# Patient Record
Sex: FEMALE | Race: BLACK OR AFRICAN AMERICAN | NOT HISPANIC OR LATINO | Employment: OTHER | URBAN - METROPOLITAN AREA
[De-identification: names, ages, dates, MRNs, and addresses within clinical notes are randomized per-mention and may not be internally consistent; named-entity substitution may affect disease eponyms.]

---

## 2017-01-06 DIAGNOSIS — E11.9 TYPE 2 DIABETES MELLITUS WITHOUT COMPLICATION: ICD-10-CM

## 2017-01-20 ENCOUNTER — TELEPHONE (OUTPATIENT)
Dept: FAMILY MEDICINE | Facility: CLINIC | Age: 82
End: 2017-01-20

## 2017-01-20 NOTE — TELEPHONE ENCOUNTER
----- Message from Peggy Langford sent at 1/19/2017  4:46 PM CST -----  Contact: Nicolasa / granddaughter  Pt's granddaughter Nicolasa is calling to request and order for a lift chair due to pt having hip replacement.  Please call Nicolasa at .    Thanks

## 2017-01-24 ENCOUNTER — OFFICE VISIT (OUTPATIENT)
Dept: FAMILY MEDICINE | Facility: CLINIC | Age: 82
End: 2017-01-24
Payer: MEDICARE

## 2017-01-24 VITALS
HEART RATE: 94 BPM | DIASTOLIC BLOOD PRESSURE: 90 MMHG | SYSTOLIC BLOOD PRESSURE: 170 MMHG | HEIGHT: 64 IN | TEMPERATURE: 98 F | OXYGEN SATURATION: 98 %

## 2017-01-24 DIAGNOSIS — E78.5 HYPERLIPIDEMIA, UNSPECIFIED HYPERLIPIDEMIA TYPE: ICD-10-CM

## 2017-01-24 DIAGNOSIS — I10 ESSENTIAL HYPERTENSION: Primary | ICD-10-CM

## 2017-01-24 DIAGNOSIS — R29.898 WEAKNESS OF BOTH LOWER EXTREMITIES: ICD-10-CM

## 2017-01-24 DIAGNOSIS — M85.80 OSTEOPENIA: ICD-10-CM

## 2017-01-24 DIAGNOSIS — E11.39 TYPE 2 DIABETES MELLITUS WITH OTHER OPHTHALMIC COMPLICATION, WITHOUT LONG-TERM CURRENT USE OF INSULIN: ICD-10-CM

## 2017-01-24 DIAGNOSIS — Z87.81 HISTORY OF HIP FRACTURE: ICD-10-CM

## 2017-01-24 PROCEDURE — 99214 OFFICE O/P EST MOD 30 MIN: CPT | Mod: S$GLB,,, | Performed by: FAMILY MEDICINE

## 2017-01-24 PROCEDURE — 99999 PR PBB SHADOW E&M-EST. PATIENT-LVL III: CPT | Mod: PBBFAC,,, | Performed by: FAMILY MEDICINE

## 2017-01-24 PROCEDURE — 1159F MED LIST DOCD IN RCRD: CPT | Mod: S$GLB,,, | Performed by: FAMILY MEDICINE

## 2017-01-24 PROCEDURE — 1157F ADVNC CARE PLAN IN RCRD: CPT | Mod: S$GLB,,, | Performed by: FAMILY MEDICINE

## 2017-01-24 PROCEDURE — 1160F RVW MEDS BY RX/DR IN RCRD: CPT | Mod: S$GLB,,, | Performed by: FAMILY MEDICINE

## 2017-01-24 PROCEDURE — 1126F AMNT PAIN NOTED NONE PRSNT: CPT | Mod: S$GLB,,, | Performed by: FAMILY MEDICINE

## 2017-01-24 PROCEDURE — 99499 UNLISTED E&M SERVICE: CPT | Mod: S$GLB,,, | Performed by: FAMILY MEDICINE

## 2017-01-24 RX ORDER — BENAZEPRIL HYDROCHLORIDE 20 MG/1
30 TABLET ORAL DAILY
Qty: 45 TABLET | Refills: 11
Start: 2017-01-24 | End: 2017-02-14 | Stop reason: RX

## 2017-01-25 ENCOUNTER — PATIENT MESSAGE (OUTPATIENT)
Dept: FAMILY MEDICINE | Facility: CLINIC | Age: 82
End: 2017-01-25

## 2017-01-25 NOTE — TELEPHONE ENCOUNTER
Several attempts have been made to contact the patient about this message, sent note thru portal to contact the clinic at their convenience.

## 2017-01-26 ENCOUNTER — TELEPHONE (OUTPATIENT)
Dept: FAMILY MEDICINE | Facility: CLINIC | Age: 82
End: 2017-01-26

## 2017-01-26 NOTE — TELEPHONE ENCOUNTER
----- Message from Sandra Villa sent at 1/26/2017 10:36 AM CST -----  Contact: Daughter/Rosmery [phone]  Patient daughter returning a phone call. Please call Rosmery at [phone] Thank you

## 2017-01-26 NOTE — TELEPHONE ENCOUNTER
----- Message from Bryce De La Vega sent at 1/20/2017 11:38 AM CST -----  Contact: Nicolasa/Salas/[phone]  Nicolasa returned staff's call. Thank you.

## 2017-01-28 DIAGNOSIS — I10 ESSENTIAL HYPERTENSION: ICD-10-CM

## 2017-01-29 RX ORDER — BENAZEPRIL HYDROCHLORIDE 20 MG/1
TABLET ORAL
Qty: 90 TABLET | Refills: 0 | Status: SHIPPED | OUTPATIENT
Start: 2017-01-29 | End: 2017-02-01 | Stop reason: DRUGHIGH

## 2017-02-01 ENCOUNTER — PATIENT MESSAGE (OUTPATIENT)
Dept: FAMILY MEDICINE | Facility: CLINIC | Age: 82
End: 2017-02-01

## 2017-02-01 NOTE — TELEPHONE ENCOUNTER
Called pharmacy and they will give the patient enough 10 mg to go with the 20 mg. Also reprinted the lift order, this was faxed to [facility] but the way this works is that the patient buys the chair and the insurance will provide the lift mechanism. Numerous messages have been sent to the patient thru the portal.

## 2017-02-02 ENCOUNTER — TELEPHONE (OUTPATIENT)
Dept: FAMILY MEDICINE | Facility: CLINIC | Age: 82
End: 2017-02-02

## 2017-02-02 NOTE — PROGRESS NOTES
Office Visit    Patient Name: Yanira More    : 1925  MRN: [id number]    Subjective:  Yanira is a 89 y.o. female who presents today for:    Hospital Follow Up (pt just released from a skilled facility ) and Fall      This patient has multiple medical diagnoses as noted below.  This patient is known to me and to this clinic. She had a recent fall and was released from a SNF.  She did have a significant hip fracture from the fall.  She complete PT/OT at the SNF.  She does not live alone.  She does report that she has increased stiffness and pain in her left hip.  She does not ambulate as well prior to this fall.  As discussed in previous office visit, we reviewed the importance of removing clutter from the home and using walking assistance devices regularly.  She slipped and this cause the most recent injury.  She has a good appetite with minimal weight loss after this injury.        Active Problem List  Patient Active Problem List   Diagnosis    Diabetes mellitus, type 2    Hyperlipidemia    Arthritis    Degenerative disc disease    Glaucoma (increased eye pressure)    Essential tremor    PAD (peripheral artery disease)    Osteopenia    Neuropathy in diabetes    Essential hypertension    Hypophosphatasia    Tortuous aorta    History of hip fracture    Weakness of both lower extremities       Past Surgical History  Past Surgical History   Procedure Laterality Date    Hysterectomy         Family History  Family History   Problem Relation Age of Onset    Hypertension Sister     Diabetes Daughter     Early death Sister 50     reports natural causes       Social History  Social History     Social History    Marital status:      Spouse name: N/A    Number of children: N/A    Years of education: N/A     Occupational History    Not on file.     Social History Main Topics    Smoking status: Never Smoker    Smokeless tobacco: Never Used    Alcohol use No    Drug use: No    Sexual  "activity: No     Other Topics Concern    Not on file     Social History Narrative    Lives with her daughter Rosmery Catalan who has power of        Current Medications  Medications reviewed and updated.     Allergies   Review of patient's allergies indicates:  No Known Allergies    Review of Systems (Pertinent positives)  Review of Systems   Constitutional: Negative for activity change, appetite change, fatigue, fever and unexpected weight change.   HENT: Negative.  Negative for ear discharge, ear pain, rhinorrhea and sore throat.    Eyes: Negative.    Respiratory: Negative for apnea, cough, chest tightness, shortness of breath and wheezing.    Cardiovascular: Negative for chest pain, palpitations and leg swelling.   Gastrointestinal: Negative for abdominal distention, abdominal pain, constipation, diarrhea and vomiting.   Endocrine: Negative for cold intolerance, heat intolerance, polydipsia and polyuria.   Genitourinary: Negative for decreased urine volume, menstrual problem, urgency, vaginal bleeding, vaginal discharge and vaginal pain.   Musculoskeletal: Positive for arthralgias, gait problem and myalgias.   Skin: Negative for rash.   Neurological: Positive for weakness. Negative for dizziness and headaches.   Hematological: Does not bruise/bleed easily.   Psychiatric/Behavioral: Negative for agitation, sleep disturbance and suicidal ideas.       Visit Vitals    BP (!) 170/90 (BP Location: Right arm, Patient Position: Sitting, BP Method: Manual)    Pulse 94    Temp 98 °F (36.7 °C) (Oral)    Ht 5' 4" (1.626 m)    SpO2 98%       Physical Exam   Constitutional: She appears well-developed.   HENT:   Head: Normocephalic and atraumatic.   Right Ear: External ear normal.   Left Ear: External ear normal.   Nose: Nose normal.   Mouth/Throat: Oropharynx is clear and moist.   Eyes: Conjunctivae and EOM are normal. Pupils are equal, round, and reactive to light.   Cardiovascular:   Murmur heard.   Systolic " murmur is present with a grade of 3/6   Pulmonary/Chest: Effort normal and breath sounds normal.   Neurological: She is alert.   Skin: Skin is warm and dry.   Psychiatric: She has a normal mood and affect. Her behavior is normal.   Vitals reviewed.      Health Maintenance  Health Maintenance Topics with due status: Not Due       Topic Last Completion Date    TETANUS VACCINE 03/19/2015    Foot Exam 01/24/2017       Assessment/Plan:  Yanira More is a 89 y.o. female who presents today for :    Yanira was seen today for hospital follow up and fall.    Diagnoses and all orders for this visit:    Essential hypertension  -     benazepril (LOTENSIN) 20 MG tablet; Take 1.5 tablets (30 mg total) by mouth once daily.  -  Take as instructed    Type 2 diabetes mellitus with other ophthalmic complication, without long-term current use of insulin  -  Pt is currently stable on medication regimen.  Continue current therapy as scheduled.  Contact office with any questions about adjustments on medications.   -  Monitor for hypoglycemic episodes   Hyperlipidemia, unspecified hyperlipidemia type  -  Pt is currently stable on medication regimen.  Continue current therapy as scheduled.  Contact office with any questions about adjustments on medications.     Osteopenia  -  Patient is stable.  Assess and addressed all modifiable risk factors.  Continue with appropriate management to prevent complications.  -  Consider change in treatment with recent fracture     History of hip fracture  -     LIFT DEVICE FOR HOME USE    Weakness of both lower extremities  -     LIFT DEVICE FOR HOME USE    Other orders  -     Cancel: DXA Bone Density Spine And Hip; Future  -     Cancel: Lipid panel; Future  -     Cancel: Hemoglobin A1c; Future  -     Cancel: Diabetic Eye Screening Photo; Future        No Follow-up on file.

## 2017-02-02 NOTE — TELEPHONE ENCOUNTER
----- Message from Christen Law sent at 2/1/2017  9:12 AM CST -----  Contact: Sarah Peralta Please call Sarah in regards to lift for patient. Sarah received fax and has other questions. Please call [phone]. Thanks

## 2017-02-02 NOTE — TELEPHONE ENCOUNTER
----- Message from Siobhan Camara sent at 2/2/2017 10:57 AM CST -----  Contact:  nurse Tessie  Pt is in need of script for glucometer and diabetic supplies. Please call Tessie @ [phone] to confirm          Thanks

## 2017-02-14 DIAGNOSIS — E11.9 TYPE II OR UNSPECIFIED TYPE DIABETES MELLITUS WITHOUT MENTION OF COMPLICATION, NOT STATED AS UNCONTROLLED: Primary | ICD-10-CM

## 2017-02-14 DIAGNOSIS — I10 ESSENTIAL HYPERTENSION: ICD-10-CM

## 2017-02-14 RX ORDER — BENAZEPRIL HYDROCHLORIDE 10 MG/1
10 TABLET ORAL DAILY
COMMUNITY
End: 2017-02-14 | Stop reason: SDUPTHER

## 2017-02-14 RX ORDER — BENAZEPRIL HYDROCHLORIDE 20 MG/1
20 TABLET ORAL DAILY
COMMUNITY
End: 2017-02-14 | Stop reason: SDUPTHER

## 2017-02-15 RX ORDER — LANCING DEVICE
1 EACH MISCELLANEOUS 2 TIMES DAILY WITH MEALS
Qty: 1 EACH | Refills: 0 | Status: SHIPPED | OUTPATIENT
Start: 2017-02-15 | End: 2018-11-29

## 2017-02-15 RX ORDER — BENAZEPRIL HYDROCHLORIDE 20 MG/1
TABLET ORAL
Qty: 90 TABLET | Refills: 0 | OUTPATIENT
Start: 2017-02-15

## 2017-02-15 RX ORDER — LANCETS
1 EACH MISCELLANEOUS 2 TIMES DAILY WITH MEALS
Qty: 100 EACH | Refills: 11 | Status: SHIPPED | OUTPATIENT
Start: 2017-02-15

## 2017-02-15 RX ORDER — BENAZEPRIL HYDROCHLORIDE 10 MG/1
10 TABLET ORAL DAILY
Qty: 90 TABLET | Refills: 3 | Status: SHIPPED | OUTPATIENT
Start: 2017-02-15 | End: 2017-05-15 | Stop reason: SDUPTHER

## 2017-02-15 RX ORDER — BENAZEPRIL HYDROCHLORIDE 20 MG/1
20 TABLET ORAL DAILY
Qty: 90 TABLET | Refills: 3 | Status: SHIPPED | OUTPATIENT
Start: 2017-02-15 | End: 2018-04-20 | Stop reason: SDUPTHER

## 2017-02-15 RX ORDER — INSULIN PUMP SYRINGE, 3 ML
EACH MISCELLANEOUS
Qty: 1 EACH | Refills: 0 | Status: SHIPPED | OUTPATIENT
Start: 2017-02-15 | End: 2019-03-27

## 2017-02-18 DIAGNOSIS — I10 ESSENTIAL HYPERTENSION: ICD-10-CM

## 2017-02-19 RX ORDER — CARBIDOPA AND LEVODOPA 25; 100 MG/1; MG/1
TABLET ORAL
Qty: 90 TABLET | Refills: 0 | Status: SHIPPED | OUTPATIENT
Start: 2017-02-19 | End: 2017-03-24 | Stop reason: SDUPTHER

## 2017-03-07 RX ORDER — METFORMIN HYDROCHLORIDE 500 MG/1
TABLET, EXTENDED RELEASE ORAL
Qty: 90 TABLET | Refills: 0 | Status: SHIPPED | OUTPATIENT
Start: 2017-03-07 | End: 2017-06-07 | Stop reason: SDUPTHER

## 2017-03-24 DIAGNOSIS — I10 ESSENTIAL HYPERTENSION: ICD-10-CM

## 2017-03-24 DIAGNOSIS — E78.5 HYPERLIPIDEMIA: ICD-10-CM

## 2017-03-24 RX ORDER — CARBIDOPA AND LEVODOPA 25; 100 MG/1; MG/1
TABLET ORAL
Qty: 90 TABLET | Refills: 0 | Status: SHIPPED | OUTPATIENT
Start: 2017-03-24 | End: 2017-06-22

## 2017-03-24 RX ORDER — LOVASTATIN 40 MG/1
TABLET ORAL
Qty: 90 TABLET | Refills: 0 | Status: SHIPPED | OUTPATIENT
Start: 2017-03-24 | End: 2017-06-28 | Stop reason: SDUPTHER

## 2017-03-26 DIAGNOSIS — I10 ESSENTIAL HYPERTENSION: ICD-10-CM

## 2017-03-26 RX ORDER — CARBIDOPA AND LEVODOPA 25; 100 MG/1; MG/1
TABLET ORAL
Qty: 90 TABLET | Refills: 0 | Status: SHIPPED | OUTPATIENT
Start: 2017-03-26 | End: 2017-05-23 | Stop reason: SDUPTHER

## 2017-04-20 DIAGNOSIS — M19.90 ARTHRITIS: ICD-10-CM

## 2017-04-20 RX ORDER — CELECOXIB 200 MG/1
CAPSULE ORAL
Qty: 90 CAPSULE | Refills: 0 | Status: SHIPPED | OUTPATIENT
Start: 2017-04-20 | End: 2017-07-11 | Stop reason: SDUPTHER

## 2017-05-15 DIAGNOSIS — I10 ESSENTIAL HYPERTENSION: ICD-10-CM

## 2017-05-15 RX ORDER — BENAZEPRIL HYDROCHLORIDE 10 MG/1
TABLET ORAL
Qty: 90 TABLET | Refills: 0 | Status: SHIPPED | OUTPATIENT
Start: 2017-05-15 | End: 2017-08-15 | Stop reason: SDUPTHER

## 2017-05-23 DIAGNOSIS — I10 ESSENTIAL HYPERTENSION: ICD-10-CM

## 2017-05-23 RX ORDER — CARBIDOPA AND LEVODOPA 25; 100 MG/1; MG/1
TABLET ORAL
Qty: 90 TABLET | Refills: 0 | Status: SHIPPED | OUTPATIENT
Start: 2017-05-23 | End: 2017-06-22 | Stop reason: SDUPTHER

## 2017-06-07 RX ORDER — METFORMIN HYDROCHLORIDE 500 MG/1
500 TABLET, EXTENDED RELEASE ORAL DAILY
Qty: 90 TABLET | Refills: 0 | Status: SHIPPED | OUTPATIENT
Start: 2017-06-07 | End: 2017-08-22 | Stop reason: SDUPTHER

## 2017-06-07 NOTE — TELEPHONE ENCOUNTER
----- Message from Peggy Langford sent at 6/7/2017  9:32 AM CDT -----  Contact: Rosmery / Daughter  Refill:    metformin (GLUCOPHAGE-XR) 500 MG 24 hr tablet    Thanks

## 2017-06-22 DIAGNOSIS — I10 ESSENTIAL HYPERTENSION: ICD-10-CM

## 2017-06-22 RX ORDER — CARBIDOPA AND LEVODOPA 25; 100 MG/1; MG/1
TABLET ORAL
Qty: 90 TABLET | Refills: 0 | Status: SHIPPED | OUTPATIENT
Start: 2017-06-22 | End: 2017-07-24 | Stop reason: SDUPTHER

## 2017-06-28 RX ORDER — LOVASTATIN 40 MG/1
40 TABLET ORAL NIGHTLY
Qty: 90 TABLET | Refills: 0 | Status: SHIPPED | OUTPATIENT
Start: 2017-06-28 | End: 2017-09-25 | Stop reason: SDUPTHER

## 2017-07-11 DIAGNOSIS — M19.90 ARTHRITIS: ICD-10-CM

## 2017-07-11 RX ORDER — CELECOXIB 200 MG/1
CAPSULE ORAL
Qty: 90 CAPSULE | Refills: 0 | Status: SHIPPED | OUTPATIENT
Start: 2017-07-11 | End: 2017-10-10 | Stop reason: SDUPTHER

## 2017-07-24 DIAGNOSIS — I10 ESSENTIAL HYPERTENSION: ICD-10-CM

## 2017-07-24 RX ORDER — CARBIDOPA AND LEVODOPA 25; 100 MG/1; MG/1
1 TABLET ORAL 3 TIMES DAILY
Qty: 90 TABLET | Refills: 0 | Status: SHIPPED | OUTPATIENT
Start: 2017-07-24 | End: 2017-08-22 | Stop reason: SDUPTHER

## 2017-07-24 NOTE — TELEPHONE ENCOUNTER
----- Message from Ana Ying sent at 7/24/2017  9:03 AM CDT -----  Contact: daughter - Rosmery   Checking on the status of a refill request for -- carbidopa-levodopa  mg (SINEMET)  mg per tablet . She states it has been over a week . She is also requesting refills so she doesn't have to call each month .      [phone]   LL

## 2017-08-15 DIAGNOSIS — I10 ESSENTIAL HYPERTENSION: ICD-10-CM

## 2017-08-15 RX ORDER — BENAZEPRIL HYDROCHLORIDE 10 MG/1
TABLET ORAL
Qty: 90 TABLET | Refills: 0 | Status: SHIPPED | OUTPATIENT
Start: 2017-08-15 | End: 2017-11-14 | Stop reason: SDUPTHER

## 2017-08-22 DIAGNOSIS — I10 ESSENTIAL HYPERTENSION: ICD-10-CM

## 2017-08-22 RX ORDER — CARBIDOPA AND LEVODOPA 25; 100 MG/1; MG/1
TABLET ORAL
Qty: 90 TABLET | Refills: 0 | Status: SHIPPED | OUTPATIENT
Start: 2017-08-22 | End: 2017-09-22 | Stop reason: SDUPTHER

## 2017-08-22 RX ORDER — METFORMIN HYDROCHLORIDE 500 MG/1
TABLET, EXTENDED RELEASE ORAL
Qty: 90 TABLET | Refills: 0 | Status: SHIPPED | OUTPATIENT
Start: 2017-08-22 | End: 2017-11-27 | Stop reason: SDUPTHER

## 2017-08-31 ENCOUNTER — OFFICE VISIT (OUTPATIENT)
Dept: FAMILY MEDICINE | Facility: CLINIC | Age: 82
End: 2017-08-31
Payer: MEDICARE

## 2017-08-31 ENCOUNTER — TELEPHONE (OUTPATIENT)
Dept: FAMILY MEDICINE | Facility: CLINIC | Age: 82
End: 2017-08-31

## 2017-08-31 VITALS
WEIGHT: 154 LBS | DIASTOLIC BLOOD PRESSURE: 80 MMHG | TEMPERATURE: 99 F | OXYGEN SATURATION: 97 % | HEART RATE: 86 BPM | BODY MASS INDEX: 26.43 KG/M2 | SYSTOLIC BLOOD PRESSURE: 130 MMHG

## 2017-08-31 DIAGNOSIS — I87.2 VENOUS STASIS DERMATITIS, UNSPECIFIED LATERALITY: Primary | ICD-10-CM

## 2017-08-31 DIAGNOSIS — I10 ESSENTIAL HYPERTENSION: ICD-10-CM

## 2017-08-31 PROCEDURE — 99999 PR PBB SHADOW E&M-EST. PATIENT-LVL III: CPT | Mod: PBBFAC,,, | Performed by: FAMILY MEDICINE

## 2017-08-31 PROCEDURE — 1126F AMNT PAIN NOTED NONE PRSNT: CPT | Mod: S$GLB,,, | Performed by: FAMILY MEDICINE

## 2017-08-31 PROCEDURE — 1159F MED LIST DOCD IN RCRD: CPT | Mod: S$GLB,,, | Performed by: FAMILY MEDICINE

## 2017-08-31 PROCEDURE — 99499 UNLISTED E&M SERVICE: CPT | Mod: S$GLB,,, | Performed by: FAMILY MEDICINE

## 2017-08-31 PROCEDURE — 99214 OFFICE O/P EST MOD 30 MIN: CPT | Mod: S$GLB,,, | Performed by: FAMILY MEDICINE

## 2017-08-31 RX ORDER — BETAMETHASONE DIPROPIONATE 0.5 MG/G
CREAM TOPICAL 2 TIMES DAILY
Qty: 45 G | Refills: 0 | Status: SHIPPED | OUTPATIENT
Start: 2017-08-31 | End: 2017-11-30 | Stop reason: SDUPTHER

## 2017-08-31 RX ORDER — AMLODIPINE BESYLATE 5 MG/1
5 TABLET ORAL DAILY
Qty: 90 TABLET | Refills: 3 | Status: SHIPPED | OUTPATIENT
Start: 2017-08-31 | End: 2018-09-03 | Stop reason: SDUPTHER

## 2017-08-31 NOTE — TELEPHONE ENCOUNTER
This is a vasovagal response.  It can occur in the elderly population.  She should go to the restroom regularly to prevent this.  They should try to track the time that she usually has a bowel movement and have her go to the restroom at this time during the day.  It does not have to be strict, but can help prevent these episodes.

## 2017-08-31 NOTE — TELEPHONE ENCOUNTER
Spoke with patient's daughter and she stated that the patient had only one episode like this prior, but just now the patient got real diaphoretic and weak and stated that she had to go to the bathroom, once she had a bowel movement she was fine. She did say that the time before she did not sweat but just got very weak. Please advise

## 2017-08-31 NOTE — PROGRESS NOTES
Office Visit    Patient Name: Yanira More    : 1925  MRN: [id number]    Subjective:  Yanira is a 89 y.o. female who presents today for:    Leg Swelling and discoloration on legs      This patient has multiple medical diagnoses as noted below.  This patient is known to me and to this clinic.   She has recently developed a rash in her lower extremities.  She has increased swelling.  This is secondary to her past.  They have been using lotion on her skin to help with dryness.  Even using vitamin E lotion.  At this time I instructed them to use increasing moisturizing lotion.      Patient Active Problem List   Diagnosis    Diabetes mellitus, type 2    Hyperlipidemia    Arthritis    Degenerative disc disease    Glaucoma (increased eye pressure)    Essential tremor    PAD (peripheral artery disease)    Osteopenia    Neuropathy in diabetes    Essential hypertension    Hypophosphatasia    Tortuous aorta    History of hip fracture    Weakness of both lower extremities       Past Surgical History:   Procedure Laterality Date    HYSTERECTOMY         Family History   Problem Relation Age of Onset    Hypertension Sister     Diabetes Daughter     Early death Sister 50     reports natural causes       Social History     Social History    Marital status:      Spouse name: N/A    Number of children: N/A    Years of education: N/A     Occupational History    Not on file.     Social History Main Topics    Smoking status: Never Smoker    Smokeless tobacco: Never Used    Alcohol use No    Drug use: No    Sexual activity: No     Other Topics Concern    Not on file     Social History Narrative    Lives with her daughter Rosmery Catalan who has power of        Current Medications  Medications reviewed and updated.     Allergies   Review of patient's allergies indicates:  No Known Allergies      Labs  Lab Results   Component Value Date    HGBA1C 5.9 2016     Lab Results    Component Value Date     11/28/2016    K 3.1 (L) 11/28/2016     11/28/2016    CO2 24 11/28/2016    BUN 11 11/28/2016    CREATININE 0.7 11/28/2016    CALCIUM 8.2 (L) 11/28/2016    ANIONGAP 8 11/28/2016    ESTGFRAFRICA >60 11/28/2016    EGFRNONAA >60 11/28/2016     Lab Results   Component Value Date    CHOL 144 01/02/2016    CHOL 202 (H) 04/30/2014    CHOL 178 09/06/2013     Lab Results   Component Value Date    HDL 68 01/02/2016    HDL 59 04/30/2014    HDL 53 09/06/2013     Lab Results   Component Value Date    LDLCALC 62.4 (L) 01/02/2016    LDLCALC 104.8 04/30/2014    LDLCALC 102.6 09/06/2013     Lab Results   Component Value Date    TRIG 68 01/02/2016    TRIG 191 (H) 04/30/2014    TRIG 112 09/06/2013     Lab Results   Component Value Date    CHOLHDL 47.2 01/02/2016    CHOLHDL 29.2 04/30/2014    CHOLHDL 29.8 09/06/2013     Last set of blood work has been reviewed as noted above.    Review of Systems   Constitutional: Negative for activity change, appetite change, fatigue, fever and unexpected weight change.   HENT: Negative.  Negative for ear discharge, ear pain, rhinorrhea and sore throat.    Eyes: Negative.    Respiratory: Negative for apnea, cough, chest tightness, shortness of breath and wheezing.    Cardiovascular: Negative for chest pain, palpitations and leg swelling.   Gastrointestinal: Negative for abdominal distention, abdominal pain, constipation, diarrhea and vomiting.   Endocrine: Negative for cold intolerance, heat intolerance, polydipsia and polyuria.   Genitourinary: Negative for decreased urine volume, menstrual problem, urgency, vaginal bleeding, vaginal discharge and vaginal pain.   Musculoskeletal: Negative.    Skin: Positive for rash.   Neurological: Negative for dizziness and headaches.   Hematological: Does not bruise/bleed easily.   Psychiatric/Behavioral: Negative for agitation, sleep disturbance and suicidal ideas.       /80 (BP Location: Left arm, Patient Position:  Sitting, BP Method: Large (Manual))   Pulse 86   Temp 98.7 °F (37.1 °C) (Oral)   SpO2 97%      Physical Exam   Constitutional: She is oriented to person, place, and time. She appears well-developed and well-nourished.   HENT:   Head: Normocephalic.   Right Ear: External ear normal.   Left Ear: External ear normal.   Nose: Nose normal.   Mouth/Throat: Oropharynx is clear and moist.   Eyes: Conjunctivae and EOM are normal. Pupils are equal, round, and reactive to light.   Cardiovascular: Normal rate, regular rhythm and normal heart sounds.    Pulmonary/Chest: Effort normal and breath sounds normal.   Neurological: She is alert and oriented to person, place, and time.   Skin: Skin is warm and dry.   Vitals reviewed.      Health Maintenance  Health Maintenance       Date Due Completion Date    Zoster Vaccine 06/26/1985 ---    Hemoglobin A1c 07/02/2016 1/2/2016    Lipid Panel 01/02/2017 1/2/2016    Influenza Vaccine 08/01/2017 1/24/2017 (Declined)    Override on 1/24/2017: Declined    Foot Exam 01/24/2018 1/24/2017    Override on 1/14/2014: Done (Dr. Kiko Winston/Podiatry)    Eye Exam 06/12/2018 6/12/2017    TETANUS VACCINE 03/19/2025 3/19/2015          Assessment/Plan:  Yanira More is a 89 y.o. female who presents today for :    1. Venous stasis dermatitis, unspecified laterality    2. Essential hypertension        Problem List Items Addressed This Visit        Unprioritized    Essential hypertension    Relevant Medications    amlodipine (NORVASC) 5 MG tablet  -  Decrease to 5 mg.  Trying to improve swelling         Other Visit Diagnoses     Venous stasis dermatitis, unspecified laterality    -  Primary    Relevant Medications    betamethasone dipropionate (DIPROLENE) 0.05 % cream  -  Use with eucerin cream   -    I have discussed the common side effects of this medication with the patient and answered all of the questions they had at the time of this visit regarding this medication.  -  RTC if symptoms worsen  or persist.            Return in about 6 months (around 2/28/2018).

## 2017-09-22 DIAGNOSIS — I10 ESSENTIAL HYPERTENSION: ICD-10-CM

## 2017-09-22 RX ORDER — CARBIDOPA AND LEVODOPA 25; 100 MG/1; MG/1
TABLET ORAL
Qty: 90 TABLET | Refills: 0 | Status: SHIPPED | OUTPATIENT
Start: 2017-09-22 | End: 2017-10-19 | Stop reason: SDUPTHER

## 2017-09-25 RX ORDER — LOVASTATIN 40 MG/1
TABLET ORAL
Qty: 90 TABLET | Refills: 0 | Status: SHIPPED | OUTPATIENT
Start: 2017-09-25 | End: 2018-01-10 | Stop reason: SDUPTHER

## 2017-10-10 DIAGNOSIS — M19.90 ARTHRITIS: ICD-10-CM

## 2017-10-10 RX ORDER — CELECOXIB 200 MG/1
CAPSULE ORAL
Qty: 90 CAPSULE | Refills: 0 | Status: SHIPPED | OUTPATIENT
Start: 2017-10-10 | End: 2018-01-05 | Stop reason: SDUPTHER

## 2017-10-19 DIAGNOSIS — I10 ESSENTIAL HYPERTENSION: ICD-10-CM

## 2017-10-19 RX ORDER — CARBIDOPA AND LEVODOPA 25; 100 MG/1; MG/1
TABLET ORAL
Qty: 90 TABLET | Refills: 0 | Status: SHIPPED | OUTPATIENT
Start: 2017-10-19 | End: 2017-11-27 | Stop reason: SDUPTHER

## 2017-11-14 DIAGNOSIS — I10 ESSENTIAL HYPERTENSION: ICD-10-CM

## 2017-11-14 RX ORDER — BENAZEPRIL HYDROCHLORIDE 10 MG/1
TABLET ORAL
Qty: 90 TABLET | Refills: 0 | Status: SHIPPED | OUTPATIENT
Start: 2017-11-14 | End: 2018-02-06 | Stop reason: SDUPTHER

## 2017-11-27 DIAGNOSIS — M81.0 OSTEOPOROSIS, SENILE: ICD-10-CM

## 2017-11-27 DIAGNOSIS — I10 ESSENTIAL HYPERTENSION: ICD-10-CM

## 2017-11-27 RX ORDER — ALENDRONATE SODIUM 70 MG/1
70 TABLET ORAL
Qty: 12 TABLET | Refills: 1 | Status: SHIPPED | OUTPATIENT
Start: 2017-11-27

## 2017-11-27 RX ORDER — CARBIDOPA AND LEVODOPA 25; 100 MG/1; MG/1
1 TABLET ORAL 3 TIMES DAILY
Qty: 90 TABLET | Refills: 0 | Status: SHIPPED | OUTPATIENT
Start: 2017-11-27 | End: 2017-12-29 | Stop reason: SDUPTHER

## 2017-11-27 RX ORDER — METFORMIN HYDROCHLORIDE 500 MG/1
TABLET, EXTENDED RELEASE ORAL
Qty: 90 TABLET | Refills: 0 | Status: SHIPPED | OUTPATIENT
Start: 2017-11-27 | End: 2018-05-29 | Stop reason: SDUPTHER

## 2017-11-27 NOTE — TELEPHONE ENCOUNTER
----- Message from Cherie Dominique sent at 11/27/2017  8:42 AM CST -----  Contact: daughter- Rosmery   Refill request for---carbidopa-levodopa  mg (SINEMET)  mg per tablet---alendronate (FOSAMAX) 70 MG tablet---- Pharmacy is [facility] on [address].  Daughter's call back [phone].

## 2017-11-30 DIAGNOSIS — I87.2 VENOUS STASIS DERMATITIS, UNSPECIFIED LATERALITY: ICD-10-CM

## 2017-11-30 RX ORDER — BETAMETHASONE DIPROPIONATE 0.5 MG/G
CREAM TOPICAL
Qty: 45 G | Refills: 0 | Status: SHIPPED | OUTPATIENT
Start: 2017-11-30

## 2017-12-28 ENCOUNTER — TELEPHONE (OUTPATIENT)
Dept: FAMILY MEDICINE | Facility: CLINIC | Age: 82
End: 2017-12-28

## 2017-12-28 NOTE — TELEPHONE ENCOUNTER
----- Message from Rufino Gary sent at 12/28/2017  9:18 AM CST -----  Contact: Daughter  REFILL: carbidopa-levodopa  mg (SINEMET)  mg per tablet

## 2017-12-29 DIAGNOSIS — I10 ESSENTIAL HYPERTENSION: ICD-10-CM

## 2017-12-29 RX ORDER — CARBIDOPA AND LEVODOPA 25; 100 MG/1; MG/1
1 TABLET ORAL 3 TIMES DAILY
Qty: 90 TABLET | Refills: 0 | Status: SHIPPED | OUTPATIENT
Start: 2017-12-29 | End: 2018-01-29 | Stop reason: SDUPTHER

## 2018-01-05 DIAGNOSIS — M19.90 ARTHRITIS: ICD-10-CM

## 2018-01-05 RX ORDER — CELECOXIB 200 MG/1
CAPSULE ORAL
Qty: 90 CAPSULE | Refills: 0 | Status: SHIPPED | OUTPATIENT
Start: 2018-01-05 | End: 2018-04-11 | Stop reason: SDUPTHER

## 2018-01-10 RX ORDER — LOVASTATIN 40 MG/1
40 TABLET ORAL NIGHTLY
Qty: 90 TABLET | Refills: 0 | Status: SHIPPED | OUTPATIENT
Start: 2018-01-10 | End: 2018-02-17 | Stop reason: SDUPTHER

## 2018-01-29 DIAGNOSIS — I10 ESSENTIAL HYPERTENSION: ICD-10-CM

## 2018-01-29 RX ORDER — CARBIDOPA AND LEVODOPA 25; 100 MG/1; MG/1
1 TABLET ORAL 3 TIMES DAILY
Qty: 270 TABLET | Refills: 0 | Status: SHIPPED | OUTPATIENT
Start: 2018-01-29 | End: 2018-04-20 | Stop reason: SDUPTHER

## 2018-01-29 NOTE — TELEPHONE ENCOUNTER
----- Message from Jessica Hall sent at 1/29/2018  8:11 AM CST -----  Contact: self  Pt calling to request a refill of carbidopa-levodopa  mg (SINEMET). Pt put request in thru pharmacy last week and is now out. Please call [phone].

## 2018-02-06 DIAGNOSIS — I10 ESSENTIAL HYPERTENSION: ICD-10-CM

## 2018-02-06 RX ORDER — BENAZEPRIL HYDROCHLORIDE 10 MG/1
TABLET ORAL
Qty: 90 TABLET | Refills: 0 | Status: SHIPPED | OUTPATIENT
Start: 2018-02-06 | End: 2018-04-20 | Stop reason: SDUPTHER

## 2018-02-19 RX ORDER — LOVASTATIN 40 MG/1
TABLET ORAL
Qty: 90 TABLET | Refills: 0 | Status: SHIPPED | OUTPATIENT
Start: 2018-02-19 | End: 2018-08-16 | Stop reason: SDUPTHER

## 2018-03-23 ENCOUNTER — PES CALL (OUTPATIENT)
Dept: ADMINISTRATIVE | Facility: CLINIC | Age: 83
End: 2018-03-23

## 2018-03-28 ENCOUNTER — LAB VISIT (OUTPATIENT)
Dept: LAB | Facility: HOSPITAL | Age: 83
End: 2018-03-28
Attending: NURSE PRACTITIONER
Payer: MEDICARE

## 2018-03-28 ENCOUNTER — OFFICE VISIT (OUTPATIENT)
Dept: FAMILY MEDICINE | Facility: CLINIC | Age: 83
End: 2018-03-28
Payer: MEDICARE

## 2018-03-28 VITALS
TEMPERATURE: 98 F | DIASTOLIC BLOOD PRESSURE: 76 MMHG | OXYGEN SATURATION: 97 % | HEART RATE: 76 BPM | HEIGHT: 64 IN | SYSTOLIC BLOOD PRESSURE: 138 MMHG

## 2018-03-28 DIAGNOSIS — E11.39 TYPE 2 DIABETES MELLITUS WITH OTHER OPHTHALMIC COMPLICATION, WITHOUT LONG-TERM CURRENT USE OF INSULIN: ICD-10-CM

## 2018-03-28 DIAGNOSIS — E08.42 DIABETIC POLYNEUROPATHY ASSOCIATED WITH DIABETES MELLITUS DUE TO UNDERLYING CONDITION: ICD-10-CM

## 2018-03-28 DIAGNOSIS — Z13.6 SCREENING FOR ISCHEMIC HEART DISEASE: ICD-10-CM

## 2018-03-28 DIAGNOSIS — I77.1 TORTUOUS AORTA: ICD-10-CM

## 2018-03-28 DIAGNOSIS — Z23 NEED FOR IMMUNIZATION AGAINST INFLUENZA: ICD-10-CM

## 2018-03-28 DIAGNOSIS — E78.5 HYPERLIPIDEMIA, UNSPECIFIED HYPERLIPIDEMIA TYPE: ICD-10-CM

## 2018-03-28 DIAGNOSIS — Z00.00 ENCOUNTER FOR PREVENTIVE HEALTH EXAMINATION: Primary | ICD-10-CM

## 2018-03-28 DIAGNOSIS — I73.9 PAD (PERIPHERAL ARTERY DISEASE): ICD-10-CM

## 2018-03-28 DIAGNOSIS — I10 ESSENTIAL HYPERTENSION: ICD-10-CM

## 2018-03-28 DIAGNOSIS — M85.80 OSTEOPENIA, UNSPECIFIED LOCATION: ICD-10-CM

## 2018-03-28 DIAGNOSIS — Z00.00 ENCOUNTER FOR PREVENTIVE HEALTH EXAMINATION: ICD-10-CM

## 2018-03-28 LAB
ALBUMIN SERPL BCP-MCNC: 3.9 G/DL
ALP SERPL-CCNC: 46 U/L
ALT SERPL W/O P-5'-P-CCNC: 5 U/L
ANION GAP SERPL CALC-SCNC: 8 MMOL/L
AST SERPL-CCNC: 17 U/L
BASOPHILS # BLD AUTO: 0.02 K/UL
BASOPHILS NFR BLD: 0.3 %
BILIRUB SERPL-MCNC: 0.3 MG/DL
BUN SERPL-MCNC: 16 MG/DL
CALCIUM SERPL-MCNC: 9.7 MG/DL
CHLORIDE SERPL-SCNC: 106 MMOL/L
CHOLEST SERPL-MCNC: 129 MG/DL
CHOLEST/HDLC SERPL: 2.6 {RATIO}
CO2 SERPL-SCNC: 27 MMOL/L
CREAT SERPL-MCNC: 0.8 MG/DL
DIFFERENTIAL METHOD: ABNORMAL
EOSINOPHIL # BLD AUTO: 0.1 K/UL
EOSINOPHIL NFR BLD: 1.7 %
ERYTHROCYTE [DISTWIDTH] IN BLOOD BY AUTOMATED COUNT: 13.6 %
EST. GFR  (AFRICAN AMERICAN): >60 ML/MIN/1.73 M^2
EST. GFR  (NON AFRICAN AMERICAN): >60 ML/MIN/1.73 M^2
ESTIMATED AVG GLUCOSE: 123 MG/DL
GLUCOSE SERPL-MCNC: 105 MG/DL
HBA1C MFR BLD HPLC: 5.9 %
HCT VFR BLD AUTO: 36.1 %
HDLC SERPL-MCNC: 49 MG/DL
HDLC SERPL: 38 %
HGB BLD-MCNC: 11.6 G/DL
IMM GRANULOCYTES # BLD AUTO: 0.03 K/UL
IMM GRANULOCYTES NFR BLD AUTO: 0.5 %
LDLC SERPL CALC-MCNC: 60.8 MG/DL
LYMPHOCYTES # BLD AUTO: 2.7 K/UL
LYMPHOCYTES NFR BLD: 42.6 %
MCH RBC QN AUTO: 30.6 PG
MCHC RBC AUTO-ENTMCNC: 32.1 G/DL
MCV RBC AUTO: 95 FL
MONOCYTES # BLD AUTO: 0.5 K/UL
MONOCYTES NFR BLD: 8.5 %
NEUTROPHILS # BLD AUTO: 3 K/UL
NEUTROPHILS NFR BLD: 46.4 %
NONHDLC SERPL-MCNC: 80 MG/DL
NRBC BLD-RTO: 0 /100 WBC
PLATELET # BLD AUTO: 187 K/UL
PMV BLD AUTO: 11.5 FL
POTASSIUM SERPL-SCNC: 4.5 MMOL/L
PROT SERPL-MCNC: 8.1 G/DL
RBC # BLD AUTO: 3.79 M/UL
SODIUM SERPL-SCNC: 141 MMOL/L
TRIGL SERPL-MCNC: 96 MG/DL
TSH SERPL DL<=0.005 MIU/L-ACNC: 1.48 UIU/ML
WBC # BLD AUTO: 6.39 K/UL

## 2018-03-28 PROCEDURE — 85025 COMPLETE CBC W/AUTO DIFF WBC: CPT

## 2018-03-28 PROCEDURE — 36415 COLL VENOUS BLD VENIPUNCTURE: CPT | Mod: PO

## 2018-03-28 PROCEDURE — 80053 COMPREHEN METABOLIC PANEL: CPT

## 2018-03-28 PROCEDURE — G0439 PPPS, SUBSEQ VISIT: HCPCS | Mod: S$GLB,,, | Performed by: NURSE PRACTITIONER

## 2018-03-28 PROCEDURE — 99499 UNLISTED E&M SERVICE: CPT | Mod: S$GLB,,, | Performed by: NURSE PRACTITIONER

## 2018-03-28 PROCEDURE — 84443 ASSAY THYROID STIM HORMONE: CPT

## 2018-03-28 PROCEDURE — 83036 HEMOGLOBIN GLYCOSYLATED A1C: CPT

## 2018-03-28 PROCEDURE — 99999 PR PBB SHADOW E&M-EST. PATIENT-LVL V: CPT | Mod: PBBFAC,,, | Performed by: NURSE PRACTITIONER

## 2018-03-28 PROCEDURE — 80061 LIPID PANEL: CPT

## 2018-03-28 NOTE — PATIENT INSTRUCTIONS
Counseling and Referral of Other Preventative  (Italic type indicates deductible and co-insurance are waived)    Patient Name: Yanira More  Today's Date: 3/28/2018    Health Maintenance       Date Due Completion Date    Zoster Vaccine 06/26/1985 ---    Hemoglobin A1c 07/02/2016 1/2/2016    Lipid Panel 01/02/2017 1/2/2016    Influenza Vaccine 08/01/2017 1/24/2017 (Declined)    Override on 1/24/2017: Declined    Foot Exam 01/24/2018 1/24/2017    Override on 1/14/2014: Done (Dr. Kiko Winston/Podiatry)    Eye Exam 06/12/2018 6/12/2017    TETANUS VACCINE 03/19/2025 3/19/2015        Orders Placed This Encounter   Procedures    Flu Vaccine - High Dose (PF) (65+)    Hemoglobin A1c    Lipid panel     The following information is provided to all patients.  This information is to help you find resources for any of the problems found today that may be affecting your health:                Living healthy guide: www.[facility].[place].[facility]      Understanding Diabetes: www.diabetes.org      Eating healthy: www.cdc.gov/healthyweight      CDC home safety checklist: www.cdc.gov/steadi/patient.html      Agency on Aging: www.goea.[place].gov      Alcoholics anonymous (AA): www.aa.org      Physical Activity: www.lizzy.nih.gov/sl4oeyx      Tobacco use: www.quitwithusla.org

## 2018-03-29 PROCEDURE — 90662 IIV NO PRSV INCREASED AG IM: CPT | Mod: S$GLB,,, | Performed by: INTERNAL MEDICINE

## 2018-03-29 PROCEDURE — G0008 ADMIN INFLUENZA VIRUS VAC: HCPCS | Mod: S$GLB,,, | Performed by: INTERNAL MEDICINE

## 2018-03-29 NOTE — PROGRESS NOTES
"Yanira More presented for an initial Medicare AWV today. The following components were reviewed and updated:    · Medical history  · Family History  · Social history  · Allergies and Current Medications  · Health Risk Assessment  · Health Maintenance  · Care Team    **See Completed Assessments for Annual Wellness visit with in the encounter summary    The following assessments were completed:  · Depression Screening  · Cognitive function Screening  · Timed Get Up Test  · Whisper Test    Vitals:    03/28/18 0914   BP: 138/76   BP Location: Right arm   Patient Position: Sitting   BP Method: Small (Manual)   Pulse: 76   Temp: 98.4 °F (36.9 °C)   TempSrc: Oral   SpO2: 97%   Height: 5' 4" (1.626 m)     There is no height or weight on file to calculate BMI.   ]        Patient not able to draw secondary to age and mild memory loss    Diagnoses and health risks identified today and associated recommendations/orders:  1. Encounter for preventive health examination  Education provided about preventive health examinations and procedures; discussed patient's health concerns, holistically addressed patient's health plan.    - CBC auto differential; Future  - Comprehensive metabolic panel; Future  - TSH; Future    2. Need for immunization against influenza  - Flu Vaccine - High Dose (PF) (65+)    3. Type 2 diabetes mellitus with other ophthalmic complication, without long-term current use of insulin  Diabetes currently is controlled. We discussed diabetic diet and regular exercise. We discussed home blood sugar monitoring, if appropriate. The current medical regimen is effective;  continue present plan and medications.    - Hemoglobin A1c; Future  - Ambulatory referral to Podiatry  - TSH; Future    4. Diabetic polyneuropathy associated with diabetes mellitus due to underlying condition  Diabetes currently is controlled. We discussed diabetic diet and regular exercise. We discussed home blood sugar monitoring, if appropriate. " The current medical regimen is effective;  continue present plan and medications.    - Ambulatory referral to Podiatry  - TSH; Future    5. PAD (peripheral artery disease)  The current medical regimen is effective;  continue present plan and medications.  - TSH; Future    6. Tortuous aorta  Stable, asymptomatic, monitor;e    7. Hyperlipidemia, unspecified hyperlipidemia type  We discussed low fat diet and regular exercise.The current medical regimen is effective; continue present plan and medications.   - Lipid panel; Future  - TSH; Future    8. Essential hypertension  Discussed sodium restriction, maintaining ideal body weight and regular exercise program as physiologic means to achieve blood pressure control. The patient will strive towards this. The current medical regimen is effective; continue present plan and medications. Recommended patient to check home readings to monitor and see me for followup as scheduled or sooner as needed. Patient was educated that both decongestant and anti-inflammatory medication may raise blood pressure  - CBC auto differential; Future  - Comprehensive metabolic panel; Future  - TSH; Future    9. Osteopenia, unspecified location  The current medical regimen is effective;  continue present plan and medications.    10. Screening for ischemic heart disease  - Lipid panel; Future  - TSH; Future      Provided Yanira with a 5-10 year written screening schedule and personal prevention plan. Recommendations were developed using the USPSTF age appropriate recommendations. Education, counseling, and referrals were provided as needed.  After Visit Summary printed and given to patient which includes a list of additional screenings\tests needed.    Follow-up in about 4 weeks (around 4/25/2018), or if symptoms worsen or fail to improve.      Higinio Marquez NP

## 2018-04-11 DIAGNOSIS — M19.90 ARTHRITIS: ICD-10-CM

## 2018-04-11 RX ORDER — CELECOXIB 200 MG/1
CAPSULE ORAL
Qty: 90 CAPSULE | Refills: 0 | Status: SHIPPED | OUTPATIENT
Start: 2018-04-11 | End: 2018-07-15 | Stop reason: SDUPTHER

## 2018-04-20 ENCOUNTER — OFFICE VISIT (OUTPATIENT)
Dept: FAMILY MEDICINE | Facility: CLINIC | Age: 83
End: 2018-04-20
Payer: MEDICARE

## 2018-04-20 VITALS
HEART RATE: 86 BPM | DIASTOLIC BLOOD PRESSURE: 70 MMHG | TEMPERATURE: 99 F | OXYGEN SATURATION: 97 % | SYSTOLIC BLOOD PRESSURE: 120 MMHG

## 2018-04-20 DIAGNOSIS — I10 ESSENTIAL HYPERTENSION: ICD-10-CM

## 2018-04-20 DIAGNOSIS — M54.9 BACK PAIN, UNSPECIFIED BACK LOCATION, UNSPECIFIED BACK PAIN LATERALITY, UNSPECIFIED CHRONICITY: ICD-10-CM

## 2018-04-20 DIAGNOSIS — I77.1 TORTUOUS AORTA: ICD-10-CM

## 2018-04-20 DIAGNOSIS — H40.9 GLAUCOMA, UNSPECIFIED GLAUCOMA TYPE, UNSPECIFIED LATERALITY: ICD-10-CM

## 2018-04-20 DIAGNOSIS — E11.39 TYPE 2 DIABETES MELLITUS WITH OTHER OPHTHALMIC COMPLICATION, WITHOUT LONG-TERM CURRENT USE OF INSULIN: Primary | ICD-10-CM

## 2018-04-20 PROCEDURE — 99214 OFFICE O/P EST MOD 30 MIN: CPT | Mod: S$GLB,,, | Performed by: FAMILY MEDICINE

## 2018-04-20 PROCEDURE — 99499 UNLISTED E&M SERVICE: CPT | Mod: S$GLB,,, | Performed by: FAMILY MEDICINE

## 2018-04-20 PROCEDURE — 99999 PR PBB SHADOW E&M-EST. PATIENT-LVL III: CPT | Mod: PBBFAC,,, | Performed by: FAMILY MEDICINE

## 2018-04-20 RX ORDER — BENAZEPRIL HYDROCHLORIDE 10 MG/1
TABLET ORAL
Qty: 90 TABLET | Refills: 3 | Status: SHIPPED | OUTPATIENT
Start: 2018-04-20 | End: 2019-04-30 | Stop reason: SDUPTHER

## 2018-04-20 RX ORDER — DICLOFENAC SODIUM 10 MG/G
GEL TOPICAL
Qty: 100 G | Refills: 1 | Status: SHIPPED | OUTPATIENT
Start: 2018-04-20

## 2018-04-20 RX ORDER — BENAZEPRIL HYDROCHLORIDE 20 MG/1
20 TABLET ORAL DAILY
Qty: 90 TABLET | Refills: 3 | Status: SHIPPED | OUTPATIENT
Start: 2018-04-20 | End: 2019-04-10 | Stop reason: SDUPTHER

## 2018-04-20 RX ORDER — CARBIDOPA AND LEVODOPA 25; 100 MG/1; MG/1
1 TABLET ORAL 3 TIMES DAILY
Qty: 270 TABLET | Refills: 0 | Status: SHIPPED | OUTPATIENT
Start: 2018-04-20 | End: 2018-07-25 | Stop reason: SDUPTHER

## 2018-04-20 NOTE — PROGRESS NOTES
Office Visit    Patient Name: Yanira More    : 1925  MRN: [id number]    Subjective:  Yanira is a 89 y.o. female who presents today for:    Numbness (right hand and arm); Diabetes; Hypertension; and Medication Refill      This patient has multiple medical diagnoses as noted below.  This patient is known to me and to this clinic. She continues to have numbness in her hands.  She is doing well with her blood pressure.  Patient denies any new symptoms including chest pain, SOB, blurry vision, N/V, diarrhea.        Patient Active Problem List   Diagnosis    Diabetes mellitus, type 2    Hyperlipidemia    Arthritis    Degenerative disc disease    Glaucoma (increased eye pressure)    Essential tremor    PAD (peripheral artery disease)    Osteopenia    Neuropathy in diabetes    Essential hypertension    Hypophosphatasia    Tortuous aorta    History of hip fracture    Weakness of both lower extremities       Past Surgical History:   Procedure Laterality Date    HYSTERECTOMY         Family History   Problem Relation Age of Onset    Hypertension Sister     Diabetes Daughter     Early death Sister 50     reports natural causes       Social History     Social History    Marital status:      Spouse name: N/A    Number of children: N/A    Years of education: N/A     Occupational History    Not on file.     Social History Main Topics    Smoking status: Never Smoker    Smokeless tobacco: Never Used    Alcohol use No    Drug use: No    Sexual activity: No     Other Topics Concern    Not on file     Social History Narrative    Lives with her daughter Rosmery Catalan who has power of        Current Medications  Medications reviewed and updated.     Allergies   Review of patient's allergies indicates:  No Known Allergies      Labs  Lab Results   Component Value Date    HGBA1C 5.9 (H) 2018     Lab Results   Component Value Date     2018    K 4.5 2018    CL  106 03/28/2018    CO2 27 03/28/2018    BUN 16 03/28/2018    CREATININE 0.8 03/28/2018    CALCIUM 9.7 03/28/2018    ANIONGAP 8 03/28/2018    ESTGFRAFRICA >60.0 03/28/2018    EGFRNONAA >60.0 03/28/2018     Lab Results   Component Value Date    CHOL 129 03/28/2018    CHOL 144 01/02/2016    CHOL 202 (H) 04/30/2014     Lab Results   Component Value Date    HDL 49 03/28/2018    HDL 68 01/02/2016    HDL 59 04/30/2014     Lab Results   Component Value Date    LDLCALC 60.8 (L) 03/28/2018    LDLCALC 62.4 (L) 01/02/2016    LDLCALC 104.8 04/30/2014     Lab Results   Component Value Date    TRIG 96 03/28/2018    TRIG 68 01/02/2016    TRIG 191 (H) 04/30/2014     Lab Results   Component Value Date    CHOLHDL 38.0 03/28/2018    CHOLHDL 47.2 01/02/2016    CHOLHDL 29.2 04/30/2014     Last set of blood work has been reviewed as noted above.    Review of Systems   Constitutional: Negative for activity change, appetite change, fatigue, fever and unexpected weight change.   HENT: Negative.  Negative for ear discharge, ear pain, rhinorrhea and sore throat.    Eyes: Negative.    Respiratory: Negative for apnea, cough, chest tightness, shortness of breath and wheezing.    Cardiovascular: Negative for chest pain, palpitations and leg swelling.   Gastrointestinal: Negative for abdominal distention, abdominal pain, constipation, diarrhea and vomiting.   Endocrine: Negative for cold intolerance, heat intolerance, polydipsia and polyuria.   Genitourinary: Negative for decreased urine volume, menstrual problem, urgency, vaginal bleeding, vaginal discharge and vaginal pain.   Musculoskeletal: Negative.    Skin: Negative for rash.   Neurological: Negative for dizziness and headaches.   Hematological: Does not bruise/bleed easily.   Psychiatric/Behavioral: Negative for agitation, sleep disturbance and suicidal ideas.       /70 (BP Location: Left arm, Patient Position: Sitting, BP Method: Small (Manual))   Pulse 86   Temp 98.7 °F (37.1 °C)  (Oral)   SpO2 97%      Physical Exam   Constitutional: She is oriented to person, place, and time. She appears well-developed and well-nourished.   HENT:   Head: Normocephalic.   Right Ear: External ear normal.   Left Ear: External ear normal.   Nose: Nose normal.   Mouth/Throat: Oropharynx is clear and moist.   Eyes: Conjunctivae and EOM are normal. Pupils are equal, round, and reactive to light.   Cardiovascular: Normal rate, regular rhythm and normal heart sounds.    Pulmonary/Chest: Effort normal and breath sounds normal.   Neurological: She is alert and oriented to person, place, and time.   Skin: Skin is warm and dry.   Vitals reviewed.      Health Maintenance  Health Maintenance       Date Due Completion Date    Zoster Vaccine 06/26/1985 ---    Foot Exam 01/24/2018 1/24/2017    Override on 1/14/2014: Done (Dr. Kiko Winston/Podiatry)    Eye Exam 06/12/2018 6/12/2017    Hemoglobin A1c 09/28/2018 3/28/2018    Lipid Panel 03/28/2019 3/28/2018    TETANUS VACCINE 03/19/2025 3/19/2015          Assessment/Plan:  Yanira More is a 89 y.o. female who presents today for :    1. Type 2 diabetes mellitus with other ophthalmic complication, without long-term current use of insulin    2. Essential hypertension    3. Back pain, unspecified back location, unspecified back pain laterality, unspecified chronicity    4. Tortuous aorta    5. Glaucoma, unspecified glaucoma type, unspecified laterality        Problem List Items Addressed This Visit        Unprioritized    Diabetes mellitus, type 2 - Primary  -   Pt is currently stable on medication regimen.  Continue current therapy as scheduled.  Contact office with any questions about adjustments on medications.       Essential hypertension    Relevant Medications    benazepril (LOTENSIN) 10 MG tablet    benazepril (LOTENSIN) 20 MG tablet    carbidopa-levodopa  mg (SINEMET)  mg per tablet    Glaucoma (increased eye pressure)    Overview     with legal  blindness  -  Noted          Tortuous aorta    Overview     as seen on portable xray from 11/22/15  -   Patient is stable.  Assess and addressed all modifiable risk factors.  Continue with appropriate management to prevent complications.             Other Visit Diagnoses     Back pain, unspecified back location, unspecified back pain laterality, unspecified chronicity        Relevant Medications    diclofenac sodium (VOLTAREN) 1 % Gel  -  Pt is currently stable on medication regimen.  Continue current therapy as scheduled.  Contact office with any questions about adjustments on medications.             Follow-up in about 6 months (around 10/20/2018).     This note was created by combination of typed  and Dragon dictation.  Transcription errors may be present.  If there are any questions, please contact me.

## 2018-05-09 ENCOUNTER — OFFICE VISIT (OUTPATIENT)
Dept: PODIATRY | Facility: CLINIC | Age: 83
End: 2018-05-09
Payer: MEDICARE

## 2018-05-09 VITALS — BODY MASS INDEX: 26.29 KG/M2 | WEIGHT: 154 LBS | HEIGHT: 64 IN

## 2018-05-09 DIAGNOSIS — I73.9 PAD (PERIPHERAL ARTERY DISEASE): Primary | ICD-10-CM

## 2018-05-09 DIAGNOSIS — B35.1 ONYCHOMYCOSIS DUE TO DERMATOPHYTE: ICD-10-CM

## 2018-05-09 DIAGNOSIS — L85.3 XEROSIS CUTIS: ICD-10-CM

## 2018-05-09 DIAGNOSIS — R26.9 GAIT ABNORMALITY: ICD-10-CM

## 2018-05-09 DIAGNOSIS — R60.0 BILATERAL LOWER EXTREMITY EDEMA: ICD-10-CM

## 2018-05-09 PROCEDURE — 11721 DEBRIDE NAIL 6 OR MORE: CPT | Mod: 59,Q8,S$GLB, | Performed by: PODIATRIST

## 2018-05-09 PROCEDURE — 99999 PR PBB SHADOW E&M-EST. PATIENT-LVL III: CPT | Mod: PBBFAC,,, | Performed by: PODIATRIST

## 2018-05-09 PROCEDURE — 99203 OFFICE O/P NEW LOW 30 MIN: CPT | Mod: 25,S$GLB,, | Performed by: PODIATRIST

## 2018-05-09 PROCEDURE — 11055 PARING/CUTG B9 HYPRKER LES 1: CPT | Mod: Q8,S$GLB,, | Performed by: PODIATRIST

## 2018-05-09 PROCEDURE — 99499 UNLISTED E&M SERVICE: CPT | Mod: S$PBB,,, | Performed by: PODIATRIST

## 2018-05-09 NOTE — PATIENT INSTRUCTIONS
Recommend lotions: eucerin, eucerin for diabetics, aquaphor, A&D ointment, gold bond for diabetics, sween, [place]'s Bees all purpose baby ointment,  urea 40 with aloe (found on amazon.com)    Shoe recommendations: (try 6pm.com, zappos.com , nordstromrack.Sonexis Technology, or shoes.Sonexis Technology for discounted prices) you can visit DSW shoes in [place]  or [facility] in the [facility] (there are also several shoe brand outlets in the [facility])    Asics (GT 2000 or gel foundations), new balance stability type shoes, saucony (stabil c3),  Urbina (GTS or Beast or transcend), vionic, propet (tennis shoe)    sofft brand, clarks, crocs, aerosoles, naturalizers, SAS, ecco, born, ankit kelly, rockports (dress shoes)    Vionic, burkenstocks, fitflops, propet (sandals)  Nike comfort thong sandals, crocs, propet (house shoes)    Nail Home remedy:  Vicks Vapor rub to nails for easier managability    Occasional soaks for 15-20 mins in luke warm water with 1 cup of listerine and 1 cup of apple cider vinegar are ok You may add several drops of oil of oregano or tea tree oil as well        Diabetes: Inspecting Your Feet  Diabetes increases your chances of developing foot problems. So inspect your feet every day. This helps you find small skin irritations before they become serious infections. If you have trouble seeing the bottoms of your feet, use a mirror or ask a family member or friend to help.     Pressure spots on the bottom of the foot are common areas where problems develop.   How to check your feet  Below are tips to help you look for foot problems. Try to check your feet at the same time each day, such as when you get out of bed in the morning:  · Check the top of each foot. The tops of toes, back of the heel, and outer edge of the foot can get a lot of rubbing from poor-fitting shoes.  · Check the bottom of each foot. Daily wear and tear often leads to problems at pressure spots.  · Check the toes and nails. Fungal infections often occur  between toes. Toenail problems can also be a sign of fungal infections or lead to breaks in the skin.  · Check your shoes, too. Loose objects inside a shoe can injure the foot. Use your hand to feel inside your shoes for things like susana, loose stitching, or rough areas that could irritate your skin.  Warning signs  Look for any color changes in the foot. Redness with streaks can signal a severe infection, which needs immediate medical attention. Tell your doctor right away if you have any of these problems:  · Swelling, sometimes with color changes, may be a sign of poor blood flow or infection. Symptoms include tenderness and an increase in the size of your foot.  · Warm or hot areas on your feet may be signs of infection. A foot that is cold may not be getting enough blood.  · Sensations such as burning, tingling, or pins and needles can be signs of a problem. Also check for areas that may be numb.  · Hot spots are caused by friction or pressure. Look for hot spots in areas that get a lot of rubbing. Hot spots can turn into blisters, calluses, or sores.  · Cracks and sores are caused by dry or irritated skin. They are a sign that the skin is breaking down, which can lead to infection.  · Toenail problems to watch for include nails growing into the skin (ingrown toenail) and causing redness or pain. Thick, yellow, or discolored nails can signal a fungal infection.  · Drainage and odor can develop from untreated sores and ulcers. Call your doctor right away if you notice white or yellow drainage, bleeding, or unpleasant odor.   © 5465-0471 [employer]. [address] [place] [address]. All rights reserved. This information is not intended as a substitute for professional medical care. Always follow your healthcare professional's instructions.        Step-by-Step:  Inspecting Your Feet (Diabetes)    Date Last Reviewed: 10/1/2016  © 2388-6263 [employer]. [address] [place] [address]  Road, [place] [address]. All rights reserved. This information is not intended as a substitute for professional medical care. Always follow your healthcare professional's instructions.

## 2018-05-09 NOTE — LETTER
May 9, 2018      Higinio Marquez, NP  [address] [facility]  [place] [address]           [facility]  [address] [facility]  [place] [address]  Phone: [phone]          Patient: Yanira More   MR Number: [id number]   YOB: 1925   Date of Visit: 5/9/2018       Dear Higinio Marquez:    Thank you for referring Yanira More to me for evaluation. Attached you will find relevant portions of my assessment and plan of care.    If you have questions, please do not hesitate to call me. I look forward to following Yanira More along with you.    Sincerely,    Disha Edge DPM    Enclosure  CC:  No Recipients    If you would like to receive this communication electronically, please contact [email or web address] or [phone] to request more information on [employer] Link access.    For providers and/or their staff who would like to refer a patient to [facility], please contact us through our one-stop-shop provider referral line, [facility] , at [phone].    If you feel you have received this communication in error or would no longer like to receive these types of communications, please e-mail [email or web address]

## 2018-05-09 NOTE — PROGRESS NOTES
"Subjective:      Patient ID: Yanira More is a 89 y.o. female.    Chief Complaint: Diabetic Foot Exam (Pcp Dr. Sanford 4/20/2018); Diabetes Mellitus; and Nail Care    Yanira is a 89 y.o. female who presents to the clinic for evaluation and treatment of high risk feet. Yanira has a past medical history of Arthritis; Blind in both eyes; Chronic low back pain; Degenerative disc disease; Depression; Diabetes mellitus type II; Essential tremor; Glaucoma; History of shingles; Hyperlipidemia; and Hypertension. The patient's chief complaint is long, thick toenails difficult for her to trim due to blindness. Daughter helps but afraid she is going to cut skin and cause bleeding. Has not seen Podiatrist in > 4 years. Patient is here to have comprehensive DM foot exam and to establish care per recommendations of PCP. Toenails hurt in shoes and with direct touch. It helps when daughter trims them but she states she does not trim very short, just a little bit. Also has a small callus or corn on the right 5th toe that hurts when wearing shoes.       PCP: Marian Sanford MD    Date Last Seen by PCP:   Chief Complaint   Patient presents with    Diabetic Foot Exam     Pcp Dr. Sanford 4/20/2018    Diabetes Mellitus    Nail Care     Vitals:    05/09/18 0955   Weight: 69.9 kg (154 lb)   Height: 5' 4" (1.626 m)   PainSc: 0-No pain       Current shoe gear: slip-on shoes.     Hemoglobin A1C   Date Value Ref Range Status   03/28/2018 5.9 (H) 4.0 - 5.6 % Final     Comment:     According to ADA guidelines, hemoglobin A1c <7.0% represents  optimal control in non-pregnant diabetic patients. Different  metrics may apply to specific patient populations.   Standards of Medical Care in Diabetes-2016.  For the purpose of screening for the presence of diabetes:  <5.7%     Consistent with the absence of diabetes  5.7-6.4%  Consistent with increasing risk for diabetes   (prediabetes)  >or=6.5%  Consistent with diabetes  Currently, no " consensus exists for use of hemoglobin A1c  for diagnosis of diabetes for children.  This Hemoglobin A1c assay has significant interference with fetal   hemoglobin   (HbF). The results are invalid for patients with abnormal amounts of   HbF,   including those with known Hereditary Persistence   of Fetal Hemoglobin. Heterozygous hemoglobin variants (HbAS, HbAC,   HbAD, HbAE, HbA2) do not significantly interfere with this assay;   however, presence of multiple variants in a sample may impact the %   interference.     01/02/2016 5.9 4.5 - 6.2 % Final   04/30/2014 6.5 (H) 4.5 - 6.2 % Final     Past Medical History:   Diagnosis Date    Arthritis     osteoarthritis of the knees    Blind in both eyes     Chronic low back pain     with spinal stenosis at L4-L5    Degenerative disc disease     spinal stenosis at L4-L5    Depression     Diabetes mellitus type II     Essential tremor     Glaucoma     with legal blindness    History of shingles     was some mild postherpetic neuralgia    Hyperlipidemia     Hypertension        Past Surgical History:   Procedure Laterality Date    HYSTERECTOMY         Family History   Problem Relation Age of Onset    Hypertension Sister     Diabetes Daughter     Early death Sister 50        reports natural causes       Social History     Social History    Marital status:      Spouse name: N/A    Number of children: N/A    Years of education: N/A     Social History Main Topics    Smoking status: Never Smoker    Smokeless tobacco: Never Used    Alcohol use No    Drug use: No    Sexual activity: No     Other Topics Concern    None     Social History Narrative    Lives with her daughter Rosmery Catalan who has power of        Current Outpatient Prescriptions   Medication Sig Dispense Refill    alendronate (FOSAMAX) 70 MG tablet Take 1 tablet (70 mg total) by mouth every 7 days. on Monday 12 tablet 1    amlodipine (NORVASC) 5 MG tablet Take 1 tablet (5 mg  total) by mouth once daily. 90 tablet 3    benazepril (LOTENSIN) 10 MG tablet TAKE ONE TABLET BY MOUTH ONCE DAILY WITH  20MG  BENAZAPRIL 90 tablet 3    benazepril (LOTENSIN) 20 MG tablet Take 1 tablet (20 mg total) by mouth once daily. 90 tablet 3    betamethasone dipropionate (DIPROLENE) 0.05 % cream APPLY  CREAM TOPICALLY TO AFFECTED AREA TWICE DAILY 45 g 0    blood sugar diagnostic Strp 1 strip by Misc.(Non-Drug; Combo Route) route 2 (two) times daily with meals. 100 strip 11    calcium-vitamin D3 500 mg(1,250mg) -200 unit per tablet Take 1 tablet by mouth 2 (two) times daily with meals.      carbidopa-levodopa  mg (SINEMET)  mg per tablet Take 1 tablet by mouth 3 (three) times daily. 270 tablet 0    celecoxib (CELEBREX) 200 MG capsule TAKE ONE CAPSULE BY MOUTH ONCE DAILY 90 capsule 0    diclofenac sodium (VOLTAREN) 1 % Gel APPLY 2G TOPICALLY ONCE DAILY to right shoulder 100 g 1    dorzolamide-timolol 2-0.5% (COSOPT) 22.3-6.8 mg/mL ophthalmic solution Place 1 drop into both eyes 2 (two) times daily. 10 mL 3    hydrocodone-acetaminophen 5-325mg (NORCO) 5-325 mg per tablet Take 1 tablet by mouth every 4 (four) hours as needed for Pain. 12 tablet 0    lancets Misc 1 lancet by Misc.(Non-Drug; Combo Route) route 2 (two) times daily with meals. 100 each 11    latanoprost 0.005 % ophthalmic solution       lovastatin (MEVACOR) 40 MG tablet TAKE ONE TABLET BY MOUTH IN THE EVENING 90 tablet 0    metFORMIN (GLUCOPHAGE-XR) 500 MG 24 hr tablet TAKE ONE TABLET BY MOUTH ONCE DAILY 90 tablet 0    multivitamin capsule Take 1 capsule by mouth once daily.      blood-glucose meter kit Use as instructed 1 each 0    lancing device Misc 1 Device by Misc.(Non-Drug; Combo Route) route 2 (two) times daily with meals. 1 each 0     No current facility-administered medications for this visit.        Review of patient's allergies indicates:  No Known Allergies      Review of Systems   Constitution: Negative for  chills, diaphoresis, fever, malaise/fatigue and night sweats.   Cardiovascular: Positive for leg swelling. Negative for claudication, cyanosis and syncope.   Skin: Positive for dry skin, nail changes and suspicious lesions (callus 5th toe right). Negative for color change, rash and unusual hair distribution.   Musculoskeletal: Positive for muscle weakness. Negative for falls, joint pain, joint swelling, muscle cramps and stiffness.        Wheelchair bound   Gastrointestinal: Negative for constipation, diarrhea, nausea and vomiting.   Neurological: Positive for numbness and sensory change. Negative for brief paralysis, disturbances in coordination, focal weakness, paresthesias and tremors.           Objective:      Physical Exam   Constitutional: She appears well-developed and well-nourished. She is cooperative. No distress.   Cardiovascular:   DP and PT pulses are trace bilaterally. 3 sec capillary refill time and toes and feet are cool to touch b/l. There is diminished hair growth on the feet and toes b/l. There is ++ edema b/l. No spider veins or varicosities present b/l.        Musculoskeletal:        Right ankle: Normal. No tenderness. Achilles tendon normal.        Left ankle: Normal. No tenderness. Achilles tendon normal.   Lower extremity weakness b/l, 4/5 MMT in DF/PF/Inv/Ev with no reproduction of pain in any direction. Wheelchair bound.     + mild pain with palpation of toenails 1-5 b/l.     + mild pain with palpation of R 5th toe PIPJ lesion.    Neurological: She is alert. A sensory deficit is present. Gait (wheelchair bound) abnormal. Coordination normal.   Decreased/absent vibratory sensation bilateral feet to 128Hz tuning fork. Protective sensation intact by 10 g monofilament all ten toes/both feet.  Pain sensation intact bilateral feet and legs.     Skin: Skin is warm, dry and intact. Lesion noted. No bruising, no ecchymosis and no rash noted. She is not diaphoretic. No cyanosis or erythema. No  pallor. Nails show no clubbing.   Focal hyperkeratotic lesion consisting entirely of hyperkeratotic tissue without open skin, drainage, pus, fluctuance, malodor, or signs of infection extensor surface pipj 5th toe right.    Otherwise,Skin thin, shiny, atrophic, with decreased density and distribution of pedal hair bilateral, but without hyperpigmentation, ulysses discoloration,  ulcers, masses, nodules or cords palpated bilateral feet and legs.    Toenails 1st, 2nd, 3rd, 4th, 5th  bilateral are hypertrophic, dystrophic, discolored tanish brown with tan, gray crumbly subungual debris.  Tender to distal nail plate pressure, without periungual skin abnormality of each.               Assessment:       Encounter Diagnoses   Name Primary?    PAD (peripheral artery disease) Yes    Bilateral lower extremity edema     Gait abnormality     Onychomycosis due to dermatophyte     Xerosis cutis          Plan:       Yanira was seen today for diabetic foot exam, diabetes mellitus and nail care.    Diagnoses and all orders for this visit:    PAD (peripheral artery disease)    Bilateral lower extremity edema    Gait abnormality    Onychomycosis due to dermatophyte    Xerosis cutis      I counseled the patient on her conditions, their implications and medical management.    - Shoe inspection. General Foot Education. Patient reminded of the importance of good nutrition. Patient instructed on proper foot hygeine. We discussed wearing proper shoe gear, daily foot inspections, never walking without protective shoe gear, caution putting sharp instruments to feet     - Discussed general foot care:  Wear comfortable, proper fitting shoes. Wash feet daily. Dry well. After drying, apply moisturizer to feet (no lotion to webspaces). Inspect feet daily for skin breaks, blisters, swelling, or redness. Wear cotton socks (preferably white)  Change socks every day. Do NOT walk barefoot. Do NOT use heating pads or warm/hot water soaks     With  patient's permission, nails were aggressively reduced and debrided 1,2,3,4, 5 R and 1,2, 3,4,5 L and filed to their soft tissue attachment mechanically and with electric , removing all offending nail and debris.  Patient tolerated this well and no blood was drawn. Patient reports relief following the procedure.     The affected area was cleansed with an alcohol prep pad. Next, utilizing a No.15 scalpel, the hyperkeratotic tissues were trimmed from R 5th toe PIPJ, down to appropriate level of skin. Care was taken to remove any nucleated core from the center of the lesion. No pinpoint bleeding was encountered. The patient tolerated relief following this procedure.     Discussed regular and routine moisturizer to skin of both feet to help improve dry skin. Advised to apply twice daily until resolution of symptoms. Avoid between toes. Moisturizer applied today.     Discussed proper and consistent elevation of lower extremities, above the level of the heart, while at rest, to help control/improve edema.     RTC 3 months, sooner PRN

## 2018-05-29 RX ORDER — METFORMIN HYDROCHLORIDE 500 MG/1
500 TABLET, EXTENDED RELEASE ORAL DAILY
Qty: 90 TABLET | Refills: 0 | Status: SHIPPED | OUTPATIENT
Start: 2018-05-29 | End: 2018-08-24 | Stop reason: SDUPTHER

## 2018-07-15 DIAGNOSIS — M19.90 ARTHRITIS: ICD-10-CM

## 2018-07-16 RX ORDER — CELECOXIB 200 MG/1
CAPSULE ORAL
Qty: 90 CAPSULE | Refills: 0 | Status: SHIPPED | OUTPATIENT
Start: 2018-07-16 | End: 2018-10-15 | Stop reason: SDUPTHER

## 2018-07-25 DIAGNOSIS — I10 ESSENTIAL HYPERTENSION: ICD-10-CM

## 2018-07-25 RX ORDER — CARBIDOPA AND LEVODOPA 25; 100 MG/1; MG/1
TABLET ORAL
Qty: 270 TABLET | Refills: 0 | Status: SHIPPED | OUTPATIENT
Start: 2018-07-25 | End: 2018-10-23 | Stop reason: SDUPTHER

## 2018-08-10 ENCOUNTER — OFFICE VISIT (OUTPATIENT)
Dept: PODIATRY | Facility: CLINIC | Age: 83
End: 2018-08-10
Payer: MEDICARE

## 2018-08-10 VITALS
SYSTOLIC BLOOD PRESSURE: 140 MMHG | WEIGHT: 154.13 LBS | DIASTOLIC BLOOD PRESSURE: 70 MMHG | BODY MASS INDEX: 26.31 KG/M2 | HEIGHT: 64 IN

## 2018-08-10 DIAGNOSIS — B35.1 ONYCHOMYCOSIS DUE TO DERMATOPHYTE: ICD-10-CM

## 2018-08-10 DIAGNOSIS — I73.9 PAD (PERIPHERAL ARTERY DISEASE): Primary | ICD-10-CM

## 2018-08-10 DIAGNOSIS — R26.9 GAIT ABNORMALITY: ICD-10-CM

## 2018-08-10 PROCEDURE — 11721 DEBRIDE NAIL 6 OR MORE: CPT | Mod: PBBFAC,PO | Performed by: PODIATRIST

## 2018-08-10 PROCEDURE — 99499 UNLISTED E&M SERVICE: CPT | Mod: S$GLB,,, | Performed by: PODIATRIST

## 2018-08-10 PROCEDURE — 99999 PR PBB SHADOW E&M-EST. PATIENT-LVL III: CPT | Mod: PBBFAC,,, | Performed by: PODIATRIST

## 2018-08-10 NOTE — PROCEDURES
Routine Foot Care  Date/Time: 8/10/2018 10:10 AM  Performed by: PHIL GRANT  Authorized by: PHIL GRANT     Consent Done?:  Yes (Verbal)  Hyperkeratotic Skin Lesions?: No      Nail Care Type:  Debride  Location(s): All  (Left 1st Toe, Left 3rd Toe, Left 2nd Toe, Left 4th Toe, Left 5th Toe, Right 1st Toe, Right 2nd Toe, Right 3rd Toe, Right 4th Toe and Right 5th Toe)  Patient tolerance:  Patient tolerated the procedure well with no immediate complications

## 2018-08-10 NOTE — PROGRESS NOTES
"Subjective:      Patient ID: Yanira More is a 89 y.o. female.    Chief Complaint: Diabetes Mellitus (pcp Juvenal ); Diabetic Foot Exam; and Nail Care    Yanira is a 89 y.o. female who presents to the clinic for evaluation and treatment of high risk feet. Yanira has a past medical history of Arthritis; Blind in both eyes; Chronic low back pain; Degenerative disc disease; Depression; Diabetes mellitus type II; Essential tremor; Glaucoma; History of shingles; Hyperlipidemia; and Hypertension. The patient's chief complaint is long, thick toenails difficult for her to trim due to blindness. Presents with daughter  .No new complaints       PCP: Marian Sanford MD    Date Last Seen by PCP: 4/20/18  Chief Complaint   Patient presents with    Diabetes Mellitus     pcp Juvenal     Diabetic Foot Exam    Nail Care     Vitals:    08/10/18 0848   BP: (!) 140/70   Weight: 69.9 kg (154 lb 1.6 oz)   Height: 5' 4" (1.626 m)   PainSc: 0-No pain       Current shoe gear: slip-on shoes.     Hemoglobin A1C   Date Value Ref Range Status   03/28/2018 5.9 (H) 4.0 - 5.6 % Final     Comment:     According to ADA guidelines, hemoglobin A1c <7.0% represents  optimal control in non-pregnant diabetic patients. Different  metrics may apply to specific patient populations.   Standards of Medical Care in Diabetes-2016.  For the purpose of screening for the presence of diabetes:  <5.7%     Consistent with the absence of diabetes  5.7-6.4%  Consistent with increasing risk for diabetes   (prediabetes)  >or=6.5%  Consistent with diabetes  Currently, no consensus exists for use of hemoglobin A1c  for diagnosis of diabetes for children.  This Hemoglobin A1c assay has significant interference with fetal   hemoglobin   (HbF). The results are invalid for patients with abnormal amounts of   HbF,   including those with known Hereditary Persistence   of Fetal Hemoglobin. Heterozygous hemoglobin variants (HbAS, HbAC,   HbAD, HbAE, HbA2) do not " significantly interfere with this assay;   however, presence of multiple variants in a sample may impact the %   interference.     01/02/2016 5.9 4.5 - 6.2 % Final   04/30/2014 6.5 (H) 4.5 - 6.2 % Final     Past Medical History:   Diagnosis Date    Arthritis     osteoarthritis of the knees    Blind in both eyes     Chronic low back pain     with spinal stenosis at L4-L5    Degenerative disc disease     spinal stenosis at L4-L5    Depression     Diabetes mellitus type II     Essential tremor     Glaucoma     with legal blindness    History of shingles     was some mild postherpetic neuralgia    Hyperlipidemia     Hypertension        Past Surgical History:   Procedure Laterality Date    HYSTERECTOMY         Family History   Problem Relation Age of Onset    Hypertension Sister     Diabetes Daughter     Early death Sister 50        reports natural causes       Social History     Social History    Marital status:      Spouse name: N/A    Number of children: N/A    Years of education: N/A     Social History Main Topics    Smoking status: Never Smoker    Smokeless tobacco: Never Used    Alcohol use No    Drug use: No    Sexual activity: No     Other Topics Concern    Not on file     Social History Narrative    Lives with her daughter Rosmery Catalan who has power of        Current Outpatient Prescriptions   Medication Sig Dispense Refill    alendronate (FOSAMAX) 70 MG tablet Take 1 tablet (70 mg total) by mouth every 7 days. on Monday 12 tablet 1    amlodipine (NORVASC) 5 MG tablet Take 1 tablet (5 mg total) by mouth once daily. 90 tablet 3    benazepril (LOTENSIN) 10 MG tablet TAKE ONE TABLET BY MOUTH ONCE DAILY WITH  20MG  BENAZAPRIL 90 tablet 3    benazepril (LOTENSIN) 20 MG tablet Take 1 tablet (20 mg total) by mouth once daily. 90 tablet 3    betamethasone dipropionate (DIPROLENE) 0.05 % cream APPLY  CREAM TOPICALLY TO AFFECTED AREA TWICE DAILY 45 g 0    blood sugar  diagnostic Strp 1 strip by Misc.(Non-Drug; Combo Route) route 2 (two) times daily with meals. 100 strip 11    calcium-vitamin D3 500 mg(1,250mg) -200 unit per tablet Take 1 tablet by mouth 2 (two) times daily with meals.      carbidopa-levodopa  mg (SINEMET)  mg per tablet TAKE 1 TABLET BY MOUTH THREE TIMES DAILY 270 tablet 0    celecoxib (CELEBREX) 200 MG capsule TAKE 1 CAPSULE BY MOUTH ONCE DAILY 90 capsule 0    diclofenac sodium (VOLTAREN) 1 % Gel APPLY 2G TOPICALLY ONCE DAILY to right shoulder 100 g 1    dorzolamide-timolol 2-0.5% (COSOPT) 22.3-6.8 mg/mL ophthalmic solution Place 1 drop into both eyes 2 (two) times daily. 10 mL 3    hydrocodone-acetaminophen 5-325mg (NORCO) 5-325 mg per tablet Take 1 tablet by mouth every 4 (four) hours as needed for Pain. 12 tablet 0    lancets Misc 1 lancet by Misc.(Non-Drug; Combo Route) route 2 (two) times daily with meals. 100 each 11    latanoprost 0.005 % ophthalmic solution       lovastatin (MEVACOR) 40 MG tablet TAKE ONE TABLET BY MOUTH IN THE EVENING 90 tablet 0    metFORMIN (GLUCOPHAGE-XR) 500 MG 24 hr tablet Take 1 tablet (500 mg total) by mouth once daily. 90 tablet 0    multivitamin capsule Take 1 capsule by mouth once daily.      blood-glucose meter kit Use as instructed 1 each 0    lancing device Misc 1 Device by Misc.(Non-Drug; Combo Route) route 2 (two) times daily with meals. 1 each 0     No current facility-administered medications for this visit.        Review of patient's allergies indicates:  No Known Allergies      Review of Systems   Constitution: Negative for chills, diaphoresis, fever, malaise/fatigue and night sweats.   Cardiovascular: Positive for leg swelling. Negative for claudication, cyanosis and syncope.   Skin: Positive for dry skin and nail changes. Negative for color change, rash, suspicious lesions and unusual hair distribution.   Musculoskeletal: Positive for muscle weakness. Negative for falls, joint pain, joint  swelling, muscle cramps and stiffness.        Wheelchair bound   Gastrointestinal: Negative for constipation, diarrhea, nausea and vomiting.   Neurological: Positive for numbness and sensory change. Negative for brief paralysis, disturbances in coordination, focal weakness, paresthesias and tremors.           Objective:      Physical Exam   Constitutional: She appears well-developed and well-nourished. She is cooperative. No distress.   Cardiovascular:   DP and PT pulses are trace bilaterally. 3 sec capillary refill time and toes and feet are cool to touch b/l. There is diminished hair growth on the feet and toes b/l. There is ++ edema b/l. No spider veins or varicosities present b/l.        Musculoskeletal:        Right ankle: Normal. No tenderness. Achilles tendon normal.        Left ankle: Normal. No tenderness. Achilles tendon normal.   Lower extremity weakness b/l, 4/5 MMT in DF/PF/Inv/Ev with no reproduction of pain in any direction. Wheelchair bound.     + mild pain with palpation of toenails 1-5 b/l.     + mild pain with palpation of R 5th toe PIPJ lesion.    Neurological: She is alert. A sensory deficit is present. Gait (wheelchair bound) abnormal. Coordination normal.   Decreased/absent vibratory sensation bilateral feet to 128Hz tuning fork. Protective sensation intact by 10 g monofilament all ten toes/both feet.  Pain sensation intact bilateral feet and legs.     Skin: Skin is warm, dry and intact. Lesion noted. No bruising, no ecchymosis and no rash noted. She is not diaphoretic. No cyanosis or erythema. No pallor. Nails show no clubbing.     .    Toenails 1st, 2nd, 3rd, 4th, 5th  bilateral are hypertrophic, dystrophic, discolored tanish brown with tan, gray crumbly subungual debris.  Tender to distal nail plate pressure, without periungual skin abnormality of each.    Otherwise,Skin thin, shiny, atrophic, with decreased density and distribution of pedal hair bilateral, but without hyperpigmentation,  ulysses discoloration,  ulcers, masses, nodules or cords palpated bilateral feet and legs.             Assessment:       Encounter Diagnoses   Name Primary?    PAD (peripheral artery disease) Yes    Gait abnormality     Onychomycosis due to dermatophyte          Plan:       Yanira was seen today for diabetes mellitus, diabetic foot exam and nail care.    Diagnoses and all orders for this visit:    PAD (peripheral artery disease)  -     Foot Care    Gait abnormality    Onychomycosis due to dermatophyte  -     Foot Care      I counseled the patient on her conditions, their implications and medical management.    - Shoe inspection. General Foot Education. Patient reminded of the importance of good nutrition. Patient instructed on proper foot hygeine. We discussed wearing proper shoe gear, daily foot inspections, never walking without protective shoe gear, caution putting sharp instruments to feet     - Discussed general foot care:  Wear comfortable, proper fitting shoes. Wash feet daily. Dry well. After drying, apply moisturizer to feet (no lotion to webspaces). Inspect feet daily for skin breaks, blisters, swelling, or redness. Wear cotton socks (preferably white)  Change socks every day. Do NOT walk barefoot. Do NOT use heating pads or warm/hot water soaks     With patient's permission, nails were aggressively reduced and debrided 1,2,3,4, 5 R and 1,2, 3,4,5 L and filed to their soft tissue attachment mechanically and with electric , removing all offending nail and debris.  Patient tolerated this well and no blood was drawn. Patient reports relief following the procedure.        Discussed regular and routine moisturizer to skin of both feet to help improve dry skin. Advised to apply twice daily until resolution of symptoms. Avoid between toes. Recommend OTC amlactin lotion to apply to B/L feet daily     RTC 3 months, sooner PRN    Shante Lara DPM

## 2018-08-13 ENCOUNTER — TELEPHONE (OUTPATIENT)
Dept: FAMILY MEDICINE | Facility: CLINIC | Age: 83
End: 2018-08-13

## 2018-08-13 NOTE — TELEPHONE ENCOUNTER
----- Message from Christa Ndiaye sent at 8/13/2018  8:33 AM CDT -----  Contact: Self   Refill : lovastatin (MEVACOR) 40 MG tablet      [facility] - [place], [place] - [address]

## 2018-08-16 RX ORDER — LOVASTATIN 40 MG/1
40 TABLET ORAL NIGHTLY
Qty: 90 TABLET | Refills: 0 | Status: SHIPPED | OUTPATIENT
Start: 2018-08-16 | End: 2018-11-07 | Stop reason: SDUPTHER

## 2018-08-24 RX ORDER — METFORMIN HYDROCHLORIDE 500 MG/1
TABLET, EXTENDED RELEASE ORAL
Qty: 90 TABLET | Refills: 0 | Status: SHIPPED | OUTPATIENT
Start: 2018-08-24 | End: 2018-11-24 | Stop reason: SDUPTHER

## 2018-09-03 DIAGNOSIS — I10 ESSENTIAL HYPERTENSION: ICD-10-CM

## 2018-09-03 RX ORDER — AMLODIPINE BESYLATE 5 MG/1
TABLET ORAL
Qty: 90 TABLET | Refills: 0 | Status: SHIPPED | OUTPATIENT
Start: 2018-09-03 | End: 2018-12-06 | Stop reason: SDUPTHER

## 2018-09-05 ENCOUNTER — TELEPHONE (OUTPATIENT)
Dept: FAMILY MEDICINE | Facility: CLINIC | Age: 83
End: 2018-09-05

## 2018-09-05 NOTE — TELEPHONE ENCOUNTER
----- Message from Jessica Hall sent at 9/5/2018  2:04 PM CDT -----  Contact: Rosmery  Please call pt daughter to discuss pt care at [phone].

## 2018-09-26 ENCOUNTER — OFFICE VISIT (OUTPATIENT)
Dept: FAMILY MEDICINE | Facility: CLINIC | Age: 83
End: 2018-09-26
Payer: MEDICARE

## 2018-09-26 ENCOUNTER — HOSPITAL ENCOUNTER (OUTPATIENT)
Dept: RADIOLOGY | Facility: HOSPITAL | Age: 83
Discharge: HOME | End: 2018-09-26
Attending: FAMILY MEDICINE
Payer: MEDICARE

## 2018-09-26 VITALS
HEIGHT: 64 IN | BODY MASS INDEX: 27.25 KG/M2 | OXYGEN SATURATION: 96 % | WEIGHT: 159.63 LBS | HEART RATE: 77 BPM | DIASTOLIC BLOOD PRESSURE: 80 MMHG | SYSTOLIC BLOOD PRESSURE: 120 MMHG | TEMPERATURE: 98 F

## 2018-09-26 DIAGNOSIS — E11.39 TYPE 2 DIABETES MELLITUS WITH OTHER OPHTHALMIC COMPLICATION, WITHOUT LONG-TERM CURRENT USE OF INSULIN: Primary | ICD-10-CM

## 2018-09-26 DIAGNOSIS — I77.1 TORTUOUS AORTA: ICD-10-CM

## 2018-09-26 DIAGNOSIS — Z11.1 SCREENING-PULMONARY TB: ICD-10-CM

## 2018-09-26 DIAGNOSIS — H40.9 GLAUCOMA, UNSPECIFIED GLAUCOMA TYPE, UNSPECIFIED LATERALITY: ICD-10-CM

## 2018-09-26 DIAGNOSIS — Z87.81 HISTORY OF HIP FRACTURE: ICD-10-CM

## 2018-09-26 DIAGNOSIS — M19.90 ARTHRITIS: ICD-10-CM

## 2018-09-26 DIAGNOSIS — Z23 NEED FOR PROPHYLACTIC VACCINATION AND INOCULATION AGAINST INFLUENZA: ICD-10-CM

## 2018-09-26 DIAGNOSIS — R29.898 WEAKNESS OF BOTH LOWER EXTREMITIES: ICD-10-CM

## 2018-09-26 DIAGNOSIS — M85.80 OSTEOPENIA, UNSPECIFIED LOCATION: ICD-10-CM

## 2018-09-26 DIAGNOSIS — G25.0 ESSENTIAL TREMOR: ICD-10-CM

## 2018-09-26 DIAGNOSIS — E08.42 DIABETIC POLYNEUROPATHY ASSOCIATED WITH DIABETES MELLITUS DUE TO UNDERLYING CONDITION: ICD-10-CM

## 2018-09-26 PROCEDURE — 1101F PT FALLS ASSESS-DOCD LE1/YR: CPT | Mod: CPTII,,, | Performed by: FAMILY MEDICINE

## 2018-09-26 PROCEDURE — 86580 TB INTRADERMAL TEST: CPT | Mod: PBBFAC,PO

## 2018-09-26 PROCEDURE — 99213 OFFICE O/P EST LOW 20 MIN: CPT | Mod: PBBFAC,PO,25 | Performed by: FAMILY MEDICINE

## 2018-09-26 PROCEDURE — 99214 OFFICE O/P EST MOD 30 MIN: CPT | Mod: S$PBB,,, | Performed by: FAMILY MEDICINE

## 2018-09-26 PROCEDURE — 90662 IIV NO PRSV INCREASED AG IM: CPT | Mod: PBBFAC,PO

## 2018-09-26 PROCEDURE — 71046 X-RAY EXAM CHEST 2 VIEWS: CPT | Mod: TC,FY,PO

## 2018-09-26 PROCEDURE — 99999 PR PBB SHADOW E&M-EST. PATIENT-LVL III: CPT | Mod: PBBFAC,,, | Performed by: FAMILY MEDICINE

## 2018-09-26 PROCEDURE — 71046 X-RAY EXAM CHEST 2 VIEWS: CPT | Mod: 26,,, | Performed by: RADIOLOGY

## 2018-09-26 NOTE — PROGRESS NOTES
Assessment & Plan  Problem List Items Addressed This Visit        Unprioritized    Arthritis    Overview     osteoarthritis of the knees         Diabetes mellitus, type 2 - Primary    Essential tremor    Glaucoma (increased eye pressure)    Overview     with legal blindness         History of hip fracture    Overview     Left distal femoral neck of the left hip         Neuropathy in diabetes    Osteopenia    Tortuous aorta    Overview     as seen on portable xray from 11/22/15         Weakness of both lower extremities      Other Visit Diagnoses     Need for prophylactic vaccination and inoculation against influenza        Relevant Orders    Influenza - High Dose (65+) (PF) (IM) (Completed)    Screening-pulmonary TB        Relevant Orders    X-Ray Chest 1 View    POCT TB Skin Test Read (Completed)        Placement within the nursing home to proceed.  Patient to get PPD as well as chest x-ray today.    Health Maintenance reviewed.    Follow-up: Follow-up in about 6 months (around 3/26/2019).    ______________________________________________________________________    Chief Complaint  Chief Complaint   Patient presents with    forms filled       HPI  Yaniar Naveen More is a 89 y.o. female with multiple medical diagnoses as listed in the medical history and problem list that presents for nursing home placement.  Pt is known to me with last appointment 4/20/2018.      The family has decided to place the patient in a nursing home.  Patient is without complaints today in the office.  Paperwork was completed.  Patient denies any chest pain, dizziness, blurry vision.  She does continue to have persistent weakness as well as pain in her knees.  Patient continues to have frequent weakness.  She is at increased risk for falls.  I did complete the paperwork related to her nursing placement.  I do believe that this is the best option for the patient at this time.  A list of her medications as well as her problem list has been  provided in the application.    PVD as well as chest x-ray to be completed today.  Patient will also obtain a flu vaccine today.      PAST MEDICAL HISTORY:  Past Medical History:   Diagnosis Date    Arthritis     osteoarthritis of the knees    Blind in both eyes     Chronic low back pain     with spinal stenosis at L4-L5    Degenerative disc disease     spinal stenosis at L4-L5    Depression     Diabetes mellitus type II     Essential tremor     Glaucoma     with legal blindness    History of shingles     was some mild postherpetic neuralgia    Hyperlipidemia     Hypertension        PAST SURGICAL HISTORY:  Past Surgical History:   Procedure Laterality Date    HEMIARTHROPLASTY Left 11/26/2016    [facility]    HYSTERECTOMY         SOCIAL HISTORY:  Social History     Socioeconomic History    Marital status:      Spouse name: Not on file    Number of children: Not on file    Years of education: Not on file    Highest education level: Not on file   Social Needs    Financial resource strain: Not on file    Food insecurity - worry: Not on file    Food insecurity - inability: Not on file    Transportation needs - medical: Not on file    Transportation needs - non-medical: Not on file   Occupational History    Not on file   Tobacco Use    Smoking status: Never Smoker    Smokeless tobacco: Never Used   Substance and Sexual Activity    Alcohol use: No    Drug use: No    Sexual activity: No   Other Topics Concern    Not on file   Social History Narrative    Lives with her daughter Rosmery Catalan who has power of        FAMILY HISTORY:  Family History   Problem Relation Age of Onset    Hypertension Sister     Diabetes Daughter     Early death Sister 50        reports natural causes       ALLERGIES AND MEDICATIONS: updated and reviewed.  Review of patient's allergies indicates:  No Known Allergies  Current Outpatient Medications   Medication Sig  Dispense Refill    carbidopa-levodopa  mg (SINEMET)  mg per tablet TAKE 1 TABLET BY MOUTH THREE TIMES DAILY 270 tablet 0    celecoxib (CELEBREX) 200 MG capsule TAKE 1 CAPSULE BY MOUTH ONCE DAILY 90 capsule 0    diclofenac sodium (VOLTAREN) 1 % Gel APPLY 2G TOPICALLY ONCE DAILY to right shoulder 100 g 1    dorzolamide-timolol 2-0.5% (COSOPT) 22.3-6.8 mg/mL ophthalmic solution Place 1 drop into both eyes 2 (two) times daily. 10 mL 3    lancets Misc 1 lancet by Misc.(Non-Drug; Combo Route) route 2 (two) times daily with meals. 100 each 11    alendronate (FOSAMAX) 70 MG tablet Take 1 tablet (70 mg total) by mouth every 7 days. on Monday 12 tablet 1    amLODIPine (NORVASC) 5 MG tablet TAKE ONE TABLET BY MOUTH ONCE DAILY 90 tablet 0    benazepril (LOTENSIN) 10 MG tablet TAKE ONE TABLET BY MOUTH ONCE DAILY WITH  20MG  BENAZAPRIL 90 tablet 3    benazepril (LOTENSIN) 20 MG tablet Take 1 tablet (20 mg total) by mouth once daily. 90 tablet 3    betamethasone dipropionate (DIPROLENE) 0.05 % cream APPLY  CREAM TOPICALLY TO AFFECTED AREA TWICE DAILY 45 g 0    blood sugar diagnostic Strp 1 strip by Misc.(Non-Drug; Combo Route) route 2 (two) times daily with meals. 100 strip 11    blood-glucose meter kit Use as instructed 1 each 0    lancing device Misc 1 Device by Misc.(Non-Drug; Combo Route) route 2 (two) times daily with meals. 1 each 0    latanoprost 0.005 % ophthalmic solution       lovastatin (MEVACOR) 40 MG tablet Take 1 tablet (40 mg total) by mouth every evening. 90 tablet 0    metFORMIN (GLUCOPHAGE-XR) 500 MG 24 hr tablet TAKE 1 TABLET BY MOUTH ONCE DAILY 90 tablet 0    multivitamin capsule Take 1 capsule by mouth once daily.       No current facility-administered medications for this visit.          ROS  Review of Systems   Constitutional: Negative for activity change, appetite change, fatigue, fever and unexpected weight change.   HENT: Negative.  Negative for ear discharge, ear pain,  "rhinorrhea and sore throat.    Eyes: Negative.    Respiratory: Negative for apnea, cough, chest tightness, shortness of breath and wheezing.    Cardiovascular: Negative for chest pain, palpitations and leg swelling.   Gastrointestinal: Negative for abdominal distention, abdominal pain, constipation, diarrhea and vomiting.   Endocrine: Negative for cold intolerance, heat intolerance, polydipsia and polyuria.   Genitourinary: Negative for decreased urine volume, menstrual problem, urgency, vaginal bleeding, vaginal discharge and vaginal pain.   Musculoskeletal: Positive for arthralgias, gait problem and myalgias.   Skin: Negative for rash.   Neurological: Positive for weakness. Negative for dizziness and headaches.   Hematological: Does not bruise/bleed easily.   Psychiatric/Behavioral: Negative for agitation, sleep disturbance and suicidal ideas.           Physical Exam  Vitals:    09/26/18 0940   BP: 120/80   Pulse: 77   Temp: 98.4 °F (36.9 °C)   TempSrc: Oral   SpO2: 96%   Weight: 72.4 kg (159 lb 9.8 oz)   Height: 5' 4" (1.626 m)    Body mass index is 27.4 kg/m².  Weight: 72.4 kg (159 lb 9.8 oz)   Height: 5' 4" (162.6 cm)   Physical Exam   Constitutional: She is oriented to person, place, and time. She appears well-developed and well-nourished.   HENT:   Head: Normocephalic.   Right Ear: External ear normal.   Left Ear: External ear normal.   Nose: Nose normal.   Mouth/Throat: Oropharynx is clear and moist.   Eyes: Conjunctivae and EOM are normal. Pupils are equal, round, and reactive to light.   Cardiovascular: Normal rate and regular rhythm. Exam reveals distant heart sounds.   Pulmonary/Chest: Effort normal and breath sounds normal.   Neurological: She is alert and oriented to person, place, and time.   Skin: Skin is warm and dry.   Vitals reviewed.        Health Maintenance       Date Due Completion Date    Zoster Vaccine 06/26/1985 ---    Foot Exam 01/24/2018 1/24/2017    Override on 1/14/2014: Done (Dr. Hoover " [facility]/Podiatry)    Eye Exam 06/12/2018 6/12/2017    Influenza Vaccine 08/01/2018 3/29/2018    Override on 1/24/2017: Declined    Hemoglobin A1c 09/28/2018 3/28/2018    Lipid Panel 03/28/2019 3/28/2018    TETANUS VACCINE 03/19/2025 3/19/2015

## 2018-09-28 ENCOUNTER — TELEPHONE (OUTPATIENT)
Dept: FAMILY MEDICINE | Facility: CLINIC | Age: 83
End: 2018-09-28

## 2018-09-28 ENCOUNTER — CLINICAL SUPPORT (OUTPATIENT)
Dept: FAMILY MEDICINE | Facility: CLINIC | Age: 83
End: 2018-09-28
Payer: MEDICARE

## 2018-09-28 DIAGNOSIS — Z11.1 SCREENING-PULMONARY TB: Primary | ICD-10-CM

## 2018-09-28 LAB
TB INDURATION - 48 HR READ: NORMAL MM
TB INDURATION - 72 HR READ: NORMAL MM
TB SKIN TEST - 48 HR READ: NORMAL
TB SKIN TEST - 72 HR READ: NORMAL

## 2018-09-28 PROCEDURE — 99499 UNLISTED E&M SERVICE: CPT | Mod: S$PBB,,, | Performed by: FAMILY MEDICINE

## 2018-09-28 NOTE — TELEPHONE ENCOUNTER
----- Message from Jeff Calhoun sent at 9/28/2018 12:08 PM CDT -----  Contact: Rosmery [phone]  Patient's daughter called on her behalf. She wants to know can the patient still be brought in today, to get the results on her tb reading. Please call at your earliest convenience.

## 2018-10-15 DIAGNOSIS — M19.90 ARTHRITIS: ICD-10-CM

## 2018-10-15 RX ORDER — CELECOXIB 200 MG/1
CAPSULE ORAL
Qty: 90 CAPSULE | Refills: 0 | Status: SHIPPED | OUTPATIENT
Start: 2018-10-15 | End: 2019-01-10 | Stop reason: SDUPTHER

## 2018-10-23 DIAGNOSIS — I10 ESSENTIAL HYPERTENSION: ICD-10-CM

## 2018-10-23 RX ORDER — CARBIDOPA AND LEVODOPA 25; 100 MG/1; MG/1
TABLET ORAL
Qty: 270 TABLET | Refills: 0 | Status: SHIPPED | OUTPATIENT
Start: 2018-10-23 | End: 2019-01-21 | Stop reason: SDUPTHER

## 2018-11-07 RX ORDER — LOVASTATIN 40 MG/1
TABLET ORAL
Qty: 90 TABLET | Refills: 0 | Status: SHIPPED | OUTPATIENT
Start: 2018-11-07 | End: 2019-02-06 | Stop reason: SDUPTHER

## 2018-11-26 RX ORDER — METFORMIN HYDROCHLORIDE 500 MG/1
TABLET, EXTENDED RELEASE ORAL
Qty: 90 TABLET | Refills: 0 | Status: SHIPPED | OUTPATIENT
Start: 2018-11-26 | End: 2019-02-19 | Stop reason: SDUPTHER

## 2018-11-29 ENCOUNTER — TELEPHONE (OUTPATIENT)
Dept: FAMILY MEDICINE | Facility: CLINIC | Age: 83
End: 2018-11-29

## 2018-11-29 ENCOUNTER — OFFICE VISIT (OUTPATIENT)
Dept: FAMILY MEDICINE | Facility: CLINIC | Age: 83
End: 2018-11-29
Payer: MEDICARE

## 2018-11-29 VITALS
HEIGHT: 64 IN | WEIGHT: 159.63 LBS | BODY MASS INDEX: 27.25 KG/M2 | SYSTOLIC BLOOD PRESSURE: 138 MMHG | TEMPERATURE: 98 F | OXYGEN SATURATION: 97 % | DIASTOLIC BLOOD PRESSURE: 86 MMHG | HEART RATE: 87 BPM

## 2018-11-29 DIAGNOSIS — S50.312A ABRASION OF LEFT ELBOW, INITIAL ENCOUNTER: ICD-10-CM

## 2018-11-29 DIAGNOSIS — E11.39 TYPE 2 DIABETES MELLITUS WITH OTHER OPHTHALMIC COMPLICATION, WITHOUT LONG-TERM CURRENT USE OF INSULIN: ICD-10-CM

## 2018-11-29 DIAGNOSIS — R51.9 RIGHT TEMPORAL HEADACHE: ICD-10-CM

## 2018-11-29 DIAGNOSIS — M25.552 LEFT HIP PAIN: ICD-10-CM
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
## 2018-11-29 DIAGNOSIS — S60.812A ABRASION OF LEFT WRIST, INITIAL ENCOUNTER: ICD-10-CM

## 2018-11-29 PROCEDURE — 99999 PR PBB SHADOW E&M-EST. PATIENT-LVL V: CPT | Mod: PBBFAC,HCNC,, | Performed by: NURSE PRACTITIONER

## 2018-11-29 PROCEDURE — 1101F PT FALLS ASSESS-DOCD LE1/YR: CPT | Mod: CPTII,HCNC,S$GLB, | Performed by: NURSE PRACTITIONER

## 2018-11-29 PROCEDURE — 99214 OFFICE O/P EST MOD 30 MIN: CPT | Mod: HCNC,S$GLB,, | Performed by: NURSE PRACTITIONER

## 2018-11-29 RX ORDER — TIMOLOL MALEATE 2.5 MG/ML
SOLUTION/ DROPS OPHTHALMIC
COMMUNITY
Start: 2018-11-06

## 2018-11-29 NOTE — PATIENT INSTRUCTIONS
Abrasions  Abrasions are skin scrapes. Their treatment depends on how large and deep the abrasion is.  Home care  You may be prescribed an antibiotic cream or ointment to apply to the wound. This helps prevent infection. Follow instructions when using this medicine.  General care  · To care for the abrasion, do the following each day for as long as directed by your healthcare provider.  ¨ If you were given a bandage, change it once a day. If your bandage sticks to the wound, soak it in warm water until it loosens.  ¨ Wash the area with soap and warm water. You may do this in a sink or under a tub faucet or shower. Rinse off the soap. Then pat the area dry with a clean towel.  ¨ If antibiotic ointment or cream was prescribed, reapply it to the wound as directed. Cover the wound with a fresh nonstick bandage. If the bandage becomes wet or dirty, change it as soon as possible.  ¨ Some antibiotic ointments or cream can cause an allergic reaction or dermatitis. This may cause redness, itching and or hives. If this occurs, stop using the ointment immediately and wash off any remaining ointment. You may need to take some allergy medicine to relieve symptoms.  · You may use acetaminophen or ibuprofen to control pain unless another pain medicine was prescribed. Talk with your healthcare provider before using these medicines if you have chronic liver or kidney disease or ever had a stomach ulcer or GI bleeding. Dont use ibuprofen in children younger than six months old.  · Most skin wounds heal within 10 days. But an infection may occur even with treatment. So its important to watch the wound for signs of infection as listed below.  Follow-up care  Follow up with your healthcare provider, or as advised.  When to seek medical advice  Call your healthcare provider right away if any of these occur:  · Fever of 100.4ºF (38ºC) or higher, or as directed by your healthcare provider  · Increasing pain, redness, swelling, or  drainage from the wound  · Bleeding from the wound that does not stop after a few minutes of steady, firm pressure  · Decreased ability to move any body part near the wound  Date Last Reviewed: 3/3/2017  © 0213-8766 [employer]. [address] [place] [address], [place] [address]. All rights reserved. This information is not intended as a substitute for professional medical care. Always follow your healthcare professional's instructions.        Preventing Falls: Making Changes in Your Living Space  Is your living space filled with hazards that could cause you to fall? Changes can make you safer. They could even save your life. Take a careful look around your home. Change what you can on your own. Hire someone or ask friends or family to help with harder tasks.      Be sure to add a nonslip mat to the inside of your shower or bathtub. Always keep a nightlight on. Keep a clear path from your bed to the bathroom. Move items from higher shelves to lower ones.   Remove hazards  · Remove things that can trip you, like throw rugs, boxes, piles of paper, or cords.  · Nail down rugs or carpeting if you don't want to remove them.  · Don't store items on stairs.  · Keep walkways clear.  · Clean up spills right away.  · Replace glass tables with wooden ones. They're safer if you fall.  Add safety devices  · Add handrails to both sides of stairs.  · Buy a raised toilet seat.  · Add grab bars near the toilet and in the shower.  · Get grabbers to help you reach things and avoid climbing.  Improve lighting  · Add nightlights to halls, bedrooms, and bathrooms.   · Put light switches at the top and bottom of stairs.  · Be sure each room and flight of stairs has proper lighting.  · Use shades or curtains to cut glare from windows.  · Put flashlights in each room. Replace burned-out bulbs.  · Get glowing light switches for room entrances.  Take other precautions  · Use nonskid floor wax.  · Buy a nonslip mat and a liquid soap  dispenser for the shower.  · Tack down carpets or use slip-resistant backing.  · Put most-used items within easy reach.  · Add bright paint or tape on the top front edge of steps.  · Save big jobs, such as moving furniture or other heavy objects, for family or friends.  · Get professional help installing grab bars. They can be unsafe if not installed the right way.  Fix riskier rooms first  Don't tackle everything at once. Focus on one room at a time. The bathroom is a common spot for falls, so you may start there. Or start with a room you spend lots of time in, such as your bedroom. Make only a few changes at once. This will give you time to adjust to them.     Outside your home  You might arrange for these changes yourself, or you might need to talk to your  or homeowners' association about them.  · Have loose boards on porches or damaged stairs repaired.  · Have rough edges, holes, or large cracks in sidewalks or driveways repaired.  · Have hazards that could trip you, such as hoses or juan, removed.  · Use high-wattage light bulbs (100 or greater) near outside doors and stairs.  · Get handrails added to outside stairs. Have them extend beyond the bottom step.  · Get help in winter weather with ice or snow removal.   Date Last Reviewed: 6/12/2015  © 6299-8595 [employer]. [address] [place] [address]. All rights reserved. This information is not intended as a substitute for professional medical care. Always follow your healthcare professional's instructions.        Preventing Falls: Are You At Risk of Falling?     Ask for help to reduce risk of falling in your home.     As you get older, you're not as steady on your feet as you once were. And you may have health problems you didn't have when you were younger. So, it's not surprising that older people are more likely to trip and fall. Falling can be very serious. It can change your overall health and quality of life.  "That's why it's important to be aware of your own risk of falling.  The dangers of falling  Falls are one of the main causes of injury in people over age 65. An older person who falls may take longer to get better than a younger person. And, after a fall, an older person is more likely to have problems that don't go away. So, preventing falls can help you avoid serious health problems.  Are you at risk of falling?  Answer these questions to rate your level of risk.  · Are you a woman?  · Have you fallen or stumbled in the last year?  · Are you over age 65?  · Are you ever dizzy or lightheaded with standing?  · Do you have a hard time getting in and out of the bathtub or on and off the toilet?  · Do you lean on objects to help you get around? Or do you use a cane or walker?  · Do you have vision or hearing problems? For example, do you need new glasses or hearing aids?  · Do you have 2 or more long-lasting (chronic) medical conditions?  · Do you take 3 or more medicines?  · Have you felt depressed recently?  · Have you had more trouble with your memory in recent months?  · Are there hazards in your home that might cause you to fall, such as loose rugs or poor lighting?  · Do you have a pet that jumps on you or might trip you?  · Have you stopped getting regular exercise?  · Do you have diabetes?   · Do you have a neurologic disease, such as Parkinson or Alzheimer disease?   · Do you drink alcohol?  · Do you wear athletic shoes or slippers, or go barefoot at home?  You can help prevent falls  If you answered "yes" to any of the above questions, you should take steps to reduce your risk of a fall. Monitoring health conditions and keeping walkways in your home free of clutter are just 2 ways. Changing is sometimes easier said than done. But keep in mind that even small changes can make you less likely to fall.  The fear of falling  It's normal to be scared of falling, especially if you've fallen before. But being afraid " can actually make you more likely to fall. This is because:  · Fear might cause you to become less active. Being less active can lead to a loss of strength and balance.  · Fear can lead to isolation from others, depression, or the use of more medicines or alcohol. And all these things make falling even more likely.  To break the cycle, learn more about ways to avoid falling. As you take control, you may find yourself feeling less afraid.   Date Last Reviewed: 6/12/2015  © 7601-2028 [employer]. [address] [place] [address], [place] [address]. All rights reserved. This information is not intended as a substitute for professional medical care. Always follow your healthcare professional's instructions.

## 2018-11-29 NOTE — PROGRESS NOTES
Subjective:       Patient ID: Yanira More is a 89 y.o. female.    Chief Complaint: Leg Pain (patients daughter states patient has fallen twice in the last week of this month.) and Headache    89 yo female presents for headache and left hip pain. Daughter reports patient fell on 11/22/2018 and 11/24/2018. She is unsure if she hit her head. Patient has been more confused, starting prior to fall. She is legally blind and having more of a hard time moving about the house. Daughter says they no longer able to leave her by herself. She was complaining of hip pain, but since fall, has gotten worse. Denies chest pain, shortness of breath.        Leg Pain    The incident occurred 3 to 5 days ago. The incident occurred at home. The injury mechanism was a fall. The pain is present in the left hip. The quality of the pain is described as aching. The pain is at a severity of 4/10. The pain has been fluctuating since onset. Pertinent negatives include no numbness or tingling. She reports no foreign bodies present. The symptoms are aggravated by movement and weight bearing. She has tried acetaminophen for the symptoms. The treatment provided moderate relief.   Headache    This is a recurrent problem. The current episode started more than 1 month ago. The problem has been gradually worsening. The pain is located in the right unilateral region. The pain does not radiate. The quality of the pain is described as aching. Associated symptoms include weakness. Pertinent negatives include no dizziness, eye pain, eye redness, numbness, scalp tenderness or tingling. Nothing aggravates the symptoms. She has tried acetaminophen for the symptoms. The treatment provided moderate relief.       Past Medical History:   Diagnosis Date    Arthritis     osteoarthritis of the knees    Blind in both eyes     Chronic low back pain     with spinal stenosis at L4-L5    Degenerative disc disease     spinal stenosis at L4-L5    Depression      "Diabetes mellitus type II     Essential tremor     Glaucoma     with legal blindness    History of shingles     was some mild postherpetic neuralgia    Hyperlipidemia     Hypertension        Social History     Socioeconomic History    Marital status:      Spouse name: Not on file    Number of children: Not on file    Years of education: Not on file    Highest education level: Not on file   Social Needs    Financial resource strain: Not on file    Food insecurity - worry: Not on file    Food insecurity - inability: Not on file    Transportation needs - medical: Not on file    Transportation needs - non-medical: Not on file   Occupational History    Not on file   Tobacco Use    Smoking status: Never Smoker    Smokeless tobacco: Never Used   Substance and Sexual Activity    Alcohol use: No    Drug use: No    Sexual activity: No   Other Topics Concern    Not on file   Social History Narrative    Lives with her daughter Rosmery Catalan who has power of        Past Surgical History:   Procedure Laterality Date    HEMIARTHROPLASTY Left 11/26/2016    [facility]    HYSTERECTOMY         Review of Systems   Eyes: Negative for pain, discharge and redness.   Musculoskeletal: Positive for arthralgias and gait problem.   Skin: Positive for wound.   Neurological: Positive for weakness and headaches. Negative for dizziness, tingling, syncope, light-headedness and numbness.       Objective:   /86 (BP Location: Left arm, Patient Position: Sitting, BP Method: Medium (Manual))   Pulse 87   Temp 98.1 °F (36.7 °C) (Oral)   Ht 5' 4" (1.626 m)   Wt 72.4 kg (159 lb 9.8 oz)   SpO2 97%   BMI 27.40 kg/m²      Physical Exam   Constitutional: She is oriented to person, place, and time. She appears well-developed and well-nourished.   HENT:   Head: Normocephalic and atraumatic. Head is without raccoon's eyes, without abrasion and without contusion. "   Cardiovascular: Normal rate, regular rhythm and normal heart sounds.   Pulmonary/Chest: Effort normal and breath sounds normal.   Musculoskeletal:        Legs:  Neurological: She is alert and oriented to person, place, and time.   + bilateral hand     Skin: Abrasion noted. She is not diaphoretic. No pallor.        Psychiatric: She has a normal mood and affect. Her speech is normal and behavior is normal.       Assessment:       1. Fall, initial encounter    2. Left hip pain    3. Abrasion of left elbow, initial encounter    4. Abrasion of left wrist, initial encounter    5. Right temporal headache    6. Type 2 diabetes mellitus with other ophthalmic complication, without long-term current use of insulin        Plan:       Yanira was seen today for leg pain and headache.    Diagnoses and all orders for this visit:    Fall, initial encounter  -     Ambulatory referral to Outpatient Case Management  -     X-Ray Hips Bilateral 2 View [employer] AP Pelvis; Future    Left hip pain  -    Continue tylenol for headache    Abrasion of left elbow, initial encounter  -     Healing    Abrasion of left wrist, initial encounter       Healing    Right temporal headache  -    Continue tylenol for headache      Follow-up in about 3 months (around 2/28/2019).

## 2018-11-29 NOTE — TELEPHONE ENCOUNTER
----- Message from KENNETH Russ sent at 11/29/2018 10:50 AM CST -----  Please inform patient daughter of degeneration (arthritis) in right and left hip . No fractures of dislocations noted.

## 2018-12-03 ENCOUNTER — TELEPHONE (OUTPATIENT)
Dept: FAMILY MEDICINE | Facility: CLINIC | Age: 83
End: 2018-12-03

## 2018-12-03 ENCOUNTER — OUTPATIENT CASE MANAGEMENT (OUTPATIENT)
Dept: ADMINISTRATIVE | Facility: OTHER | Age: 83
End: 2018-12-03

## 2018-12-03 NOTE — PROGRESS NOTES
Thank you for the referral.  Patient has been assigned to Lesly Cadena LMSW for low risk screening for Outpatient Case Management.     Reason for referral: Fall, initial encounter    Please contact [facility] at ext. [phone] with any questions.    Thank you,    Na Perrin    OPCM

## 2018-12-03 NOTE — TELEPHONE ENCOUNTER
----- Message from Na Perrin sent at 12/3/2018 10:23 AM CST -----  Thank you for the referral.  Patient has been assigned to Lesly Cadena LMSW for low risk screening for Outpatient Case Management.     Reason for referral: Fall, initial encounter    Please contact [facility] at ext. [phone] with any questions.    Thank you,    aN Perrin    OPCM

## 2018-12-04 ENCOUNTER — TELEPHONE (OUTPATIENT)
Dept: FAMILY MEDICINE | Facility: CLINIC | Age: 83
End: 2018-12-04

## 2018-12-04 ENCOUNTER — OUTPATIENT CASE MANAGEMENT (OUTPATIENT)
Dept: ADMINISTRATIVE | Facility: OTHER | Age: 83
End: 2018-12-04

## 2018-12-04 NOTE — TELEPHONE ENCOUNTER
----- Message from Lesly Cadena LMSW sent at 12/4/2018  3:04 PM CST -----  This LMSW received a referral on the above patient. This LMSW provided patient/caregiver with the following resource: Medicaid Long Term Care Application, Senior Resource Guide.     Thank you for the referral,    Lesly Cadena LMSW

## 2018-12-04 NOTE — PROGRESS NOTES
This LMSW received a referral on the above patient.   Reason for referral: Low risk, Fall, initial encounter   Name of the community resource that was provided: Medicaid Long Term Care Application, Senior Resource Guide  Resource given to: Patient's daughter via [place] mail     LMSW completed assessment with Pt's daughter, Rosmery. Pt's great grandson lives with patient. Patient requires assistance with ADLs/IADL. Patient is legally blind and family assist patient with ambulating using walker. Pt's daughter reports Pt's great grandson is home with patient during the day and provides support. Pt's daughter reports she assist with patient's care during the evenings. She reports family is interested in nursing home placement for patient. Pt's daughter reports she contacted her top choices for placement and was informed those facilities have no availability. Pt's daughter reports patient's monthly income is approximately 1200 per month. Pt's daughter reports patient owns her home. LMSW asked Pt's daughter how patient is will pay for nursing home care and patient's daughter reports patient will use her social security check. LMSW educated Pt's daughter on cost of nursing home care and advised Pt's daughter patient will need help covering cost of nursing home placement. LMSW explained services provided through Medicaid LTC Program and encouraged patient's family to review criteria and apply for services if patient eligible. LMSW offered to send Pt's daughter application for LTC and she verbalized agreement. Pt's daughter advised to contact [facility] with any question or concerns. Resource mailed to Pt's address on file as requested. Referral source notified. Case closed.

## 2018-12-06 DIAGNOSIS — I10 ESSENTIAL HYPERTENSION: ICD-10-CM

## 2018-12-06 RX ORDER — AMLODIPINE BESYLATE 5 MG/1
TABLET ORAL
Qty: 90 TABLET | Refills: 0 | Status: SHIPPED | OUTPATIENT
Start: 2018-12-06 | End: 2019-03-04 | Stop reason: SDUPTHER

## 2019-01-10 DIAGNOSIS — M19.90 ARTHRITIS: ICD-10-CM

## 2019-01-10 RX ORDER — CELECOXIB 200 MG/1
CAPSULE ORAL
Qty: 90 CAPSULE | Refills: 0 | Status: SHIPPED | OUTPATIENT
Start: 2019-01-10 | End: 2019-04-09 | Stop reason: SDUPTHER

## 2019-01-11 ENCOUNTER — HOSPITAL ENCOUNTER (EMERGENCY)
Facility: HOSPITAL | Age: 84
Discharge: HOME | End: 2019-01-11
Attending: EMERGENCY MEDICINE
Payer: MEDICARE

## 2019-01-11 VITALS
HEIGHT: 64 IN | SYSTOLIC BLOOD PRESSURE: 148 MMHG | BODY MASS INDEX: 28 KG/M2 | OXYGEN SATURATION: 99 % | TEMPERATURE: 98 F | DIASTOLIC BLOOD PRESSURE: 73 MMHG | RESPIRATION RATE: 15 BRPM | WEIGHT: 164 LBS | HEART RATE: 76 BPM

## 2019-01-11 DIAGNOSIS — S00.531A CONTUSION OF LIP, INITIAL ENCOUNTER: ICD-10-CM

## 2019-01-11 DIAGNOSIS — N39.0 URINARY TRACT INFECTION WITHOUT HEMATURIA, SITE UNSPECIFIED: Primary | ICD-10-CM

## 2019-01-11 DIAGNOSIS — M25.559 HIP PAIN: ICD-10-CM
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
## 2019-01-11 LAB
ALBUMIN SERPL BCP-MCNC: 4.6 G/DL
ALP SERPL-CCNC: 57 U/L
ALT SERPL W/O P-5'-P-CCNC: 11 U/L
ANION GAP SERPL CALC-SCNC: 13 MMOL/L
AST SERPL-CCNC: 18 U/L
BACTERIA #/AREA URNS HPF: ABNORMAL /HPF
BASOPHILS # BLD AUTO: 0.01 K/UL
BASOPHILS NFR BLD: 0.1 %
BILIRUB SERPL-MCNC: 0.3 MG/DL
BILIRUB UR QL STRIP: NEGATIVE
BNP SERPL-MCNC: 20 PG/ML
BUN SERPL-MCNC: 14 MG/DL
CALCIUM SERPL-MCNC: 10.2 MG/DL
CHLORIDE SERPL-SCNC: 104 MMOL/L
CK SERPL-CCNC: 280 U/L
CLARITY UR: CLEAR
CO2 SERPL-SCNC: 23 MMOL/L
COLOR UR: YELLOW
CREAT SERPL-MCNC: 0.9 MG/DL
DIFFERENTIAL METHOD: ABNORMAL
EOSINOPHIL # BLD AUTO: 0 K/UL
EOSINOPHIL NFR BLD: 0.1 %
ERYTHROCYTE [DISTWIDTH] IN BLOOD BY AUTOMATED COUNT: 13.9 %
EST. GFR  (AFRICAN AMERICAN): >60 ML/MIN/1.73 M^2
EST. GFR  (NON AFRICAN AMERICAN): 55 ML/MIN/1.73 M^2
GLUCOSE SERPL-MCNC: 110 MG/DL
GLUCOSE UR QL STRIP: NEGATIVE
HCT VFR BLD AUTO: 38.7 %
HGB BLD-MCNC: 12.9 G/DL
HGB UR QL STRIP: ABNORMAL
HYALINE CASTS #/AREA URNS LPF: 1 /LPF
INR PPP: 1
KETONES UR QL STRIP: NEGATIVE
LEUKOCYTE ESTERASE UR QL STRIP: NEGATIVE
LYMPHOCYTES # BLD AUTO: 1.5 K/UL
LYMPHOCYTES NFR BLD: 16.6 %
MCH RBC QN AUTO: 30.1 PG
MCHC RBC AUTO-ENTMCNC: 33.3 G/DL
MCV RBC AUTO: 90 FL
MICROSCOPIC COMMENT: ABNORMAL
MONOCYTES # BLD AUTO: 0.4 K/UL
MONOCYTES NFR BLD: 4.4 %
NEUTROPHILS # BLD AUTO: 6.9 K/UL
NEUTROPHILS NFR BLD: 78.5 %
NITRITE UR QL STRIP: POSITIVE
PH UR STRIP: 6 [PH] (ref 5–8)
PLATELET # BLD AUTO: 202 K/UL
PMV BLD AUTO: 10.9 FL
POTASSIUM SERPL-SCNC: 4 MMOL/L
PROT SERPL-MCNC: 8.7 G/DL
PROT UR QL STRIP: ABNORMAL
PROTHROMBIN TIME: 10.6 SEC
RBC # BLD AUTO: 4.28 M/UL
RBC #/AREA URNS HPF: 3 /HPF (ref 0–4)
SODIUM SERPL-SCNC: 140 MMOL/L
SP GR UR STRIP: 1.01 (ref 1–1.03)
TROPONIN I SERPL DL<=0.01 NG/ML-MCNC: 0.01 NG/ML
URN SPEC COLLECT METH UR: ABNORMAL
UROBILINOGEN UR STRIP-ACNC: NEGATIVE EU/DL
WBC # BLD AUTO: 8.81 K/UL
WBC #/AREA URNS HPF: 5 /HPF (ref 0–5)

## 2019-01-11 PROCEDURE — 25000003 PHARM REV CODE 250: Mod: HCNC | Performed by: EMERGENCY MEDICINE

## 2019-01-11 PROCEDURE — 96365 THER/PROPH/DIAG IV INF INIT: CPT | Mod: HCNC

## 2019-01-11 PROCEDURE — 80053 COMPREHEN METABOLIC PANEL: CPT | Mod: HCNC

## 2019-01-11 PROCEDURE — 93010 EKG 12-LEAD: ICD-10-PCS | Mod: HCNC,,, | Performed by: INTERNAL MEDICINE

## 2019-01-11 PROCEDURE — 93010 ELECTROCARDIOGRAM REPORT: CPT | Mod: HCNC,,, | Performed by: INTERNAL MEDICINE

## 2019-01-11 PROCEDURE — 99285 EMERGENCY DEPT VISIT HI MDM: CPT | Mod: 25,HCNC

## 2019-01-11 PROCEDURE — 84484 ASSAY OF TROPONIN QUANT: CPT | Mod: HCNC

## 2019-01-11 PROCEDURE — 93005 ELECTROCARDIOGRAM TRACING: CPT | Mod: HCNC

## 2019-01-11 PROCEDURE — 82550 ASSAY OF CK (CPK): CPT | Mod: HCNC

## 2019-01-11 PROCEDURE — 63600175 PHARM REV CODE 636 W HCPCS: Mod: HCNC | Performed by: EMERGENCY MEDICINE

## 2019-01-11 PROCEDURE — 85610 PROTHROMBIN TIME: CPT | Mod: HCNC

## 2019-01-11 PROCEDURE — 83880 ASSAY OF NATRIURETIC PEPTIDE: CPT | Mod: HCNC

## 2019-01-11 PROCEDURE — 85025 COMPLETE CBC W/AUTO DIFF WBC: CPT | Mod: HCNC

## 2019-01-11 PROCEDURE — 81000 URINALYSIS NONAUTO W/SCOPE: CPT | Mod: HCNC

## 2019-01-11 RX ORDER — BENAZEPRIL HYDROCHLORIDE 10 MG/1
10 TABLET ORAL
Status: COMPLETED | OUTPATIENT
Start: 2019-01-11 | End: 2019-01-11

## 2019-01-11 RX ORDER — CEPHALEXIN 500 MG/1
500 CAPSULE ORAL EVERY 12 HOURS
Qty: 14 CAPSULE | Refills: 0 | Status: SHIPPED | OUTPATIENT
Start: 2019-01-11 | End: 2019-01-18

## 2019-01-11 RX ORDER — ACETAMINOPHEN 325 MG/1
650 TABLET ORAL
Status: COMPLETED | OUTPATIENT
Start: 2019-01-11 | End: 2019-01-11

## 2019-01-11 RX ORDER — AMLODIPINE BESYLATE 5 MG/1
5 TABLET ORAL
Status: COMPLETED | OUTPATIENT
Start: 2019-01-11 | End: 2019-01-11

## 2019-01-11 RX ORDER — CARBIDOPA AND LEVODOPA 25; 100 MG/1; MG/1
2 TABLET ORAL
Status: COMPLETED | OUTPATIENT
Start: 2019-01-11 | End: 2019-01-11

## 2019-01-11 RX ADMIN — BENAZEPRIL HYDROCHLORIDE 10 MG: 10 TABLET, FILM COATED ORAL at 08:01

## 2019-01-11 RX ADMIN — CARBIDOPA AND LEVODOPA 2 TABLET: 25; 100 TABLET ORAL at 08:01

## 2019-01-11 RX ADMIN — AMLODIPINE BESYLATE 5 MG: 5 TABLET ORAL at 08:01

## 2019-01-11 RX ADMIN — CEFTRIAXONE 1 G: 1 INJECTION, SOLUTION INTRAVENOUS at 10:01

## 2019-01-11 RX ADMIN — ACETAMINOPHEN 650 MG: 325 TABLET ORAL at 08:01

## 2019-01-11 NOTE — ED TRIAGE NOTES
Pt to the ED via personal vehicle with c/o unwitnessed fall which occurred this morning. Pt reports falling when attempting to go to restroom, pt reports pain to R leg, arm and mouth. Pt reports hitting head and denies LOC. Pt denies chest pain, SOB, numbness, dizziness, nausea, and vomiting. Noted bruising to R side of mouth. Pt has hx of dementia, however is AAOx4. No acute distress noted. Daughter at bedside. Pt placed on cardiac monitor, continuous pulse ox and BP cuff. Call light within reach.

## 2019-01-11 NOTE — ED PROVIDER NOTES
Encounter Date: 1/11/2019       History     Chief Complaint   Patient presents with    Fall     unwitnessed fall this morning; c/o right sided jaw pain; denies hitting head; denies taking blood thinners     89-year-old female with history of diabetes, glaucoma with legal blindness in both eyes, arthritis, hypertension presents with right-sided facial pain after a fall.  Patient reports she got out of bed this morning and fell to the ground.  She denies tripping, she denies loss of consciousness, she denies any preceding symptoms her fall.  She hit the right side of her face on the ground.  She also complains of some right leg pain located to her knee and her hip.  She denies headache, nausea or vomiting, no chest pain or shortness of breath. Aside from the pain in her right upper lip and her right leg, she denies other complaints.  She does not take blood thinner medications.  She did not take her normal blood pressure medicines this morning nor did she take her L-dopa          Review of patient's allergies indicates:  No Known Allergies  Past Medical History:   Diagnosis Date    Arthritis     osteoarthritis of the knees    Blind in both eyes     Chronic low back pain     with spinal stenosis at L4-L5    Degenerative disc disease     spinal stenosis at L4-L5    Depression     Diabetes mellitus type II     Essential tremor     Glaucoma     with legal blindness    History of shingles     was some mild postherpetic neuralgia    Hyperlipidemia     Hypertension      Past Surgical History:   Procedure Laterality Date    HEMIARTHROPLASTY Left 11/26/2016    [facility] at [facility]    HYSTERECTOMY       Family History   Problem Relation Age of Onset    Hypertension Sister     Diabetes Daughter     Early death Sister 50        reports natural causes     Social History     Tobacco Use    Smoking status: Never Smoker    Smokeless tobacco: Never Used   Substance Use Topics    Alcohol  use: No    Drug use: No     Review of Systems   Constitutional: Negative for chills and fever.   HENT: Positive for facial swelling. Negative for sore throat.    Eyes: Negative for discharge.   Respiratory: Negative for cough and shortness of breath.    Cardiovascular: Negative for chest pain.   Gastrointestinal: Negative for abdominal pain, nausea and vomiting.   Genitourinary: Negative for dysuria and vaginal discharge.   Musculoskeletal: Positive for arthralgias. Negative for back pain and neck pain.   Skin: Negative for wound.   Allergic/Immunologic: Negative for immunocompromised state.   Neurological: Negative for syncope, weakness, numbness and headaches.       Physical Exam     Initial Vitals [01/11/19 0807]   BP Pulse Resp Temp SpO2   (!) 204/93 97 20 98.8 °F (37.1 °C) 96 %      MAP       --         Physical Exam    Constitutional: She appears well-developed and well-nourished. She is not diaphoretic. No distress.   HENT:   Head: Head is with contusion. Head is without raccoon's eyes, without right periorbital erythema and without left periorbital erythema.       Nose: No sinus tenderness, nasal deformity, septal deviation or nasal septal hematoma. No epistaxis.   Mouth/Throat: Oropharynx is clear and moist.   Eyes: Pupils are equal, round, and reactive to light.   Neck: Neck supple. No spinous process tenderness and no muscular tenderness present.   Cardiovascular: Normal rate and regular rhythm.   Pulmonary/Chest: Breath sounds normal.   Abdominal: Soft. There is no tenderness.   Musculoskeletal: She exhibits no edema.        Right hip: She exhibits tenderness. She exhibits normal range of motion, no bony tenderness and no deformity.        Right knee: She exhibits normal range of motion, no swelling, no ecchymosis, no deformity, no laceration and no erythema. Tenderness (Diffuse tender to palpation) found.        Cervical back: She exhibits no tenderness, no bony tenderness and no deformity.         Thoracic back: She exhibits no tenderness, no bony tenderness and no deformity.        Lumbar back: She exhibits no tenderness, no bony tenderness and no deformity.   Neurological: She is alert.   Moves all 4 extremities. Sensation to light touch intact in all 4 extremities.   Skin: Skin is warm and dry.   Psychiatric: She has a normal mood and affect.         ED Course   Procedures  Labs Reviewed   CBC W/ AUTO DIFFERENTIAL - Abnormal; Notable for the following components:       Result Value    Gran% 78.5 (*)     Lymph% 16.6 (*)     All other components within normal limits   COMPREHENSIVE METABOLIC PANEL - Abnormal; Notable for the following components:    Total Protein 8.7 (*)     eGFR if non  55 (*)     All other components within normal limits   URINALYSIS, REFLEX TO URINE CULTURE - Abnormal; Notable for the following components:    Protein, UA 1+ (*)     Occult Blood UA 1+ (*)     Nitrite, UA Positive (*)     All other components within normal limits    Narrative:     Preferred Collection Type->Urine, Clean Catch   CK - Abnormal; Notable for the following components:     (*)     All other components within normal limits   URINALYSIS MICROSCOPIC - Abnormal; Notable for the following components:    Bacteria, UA Many (*)     All other components within normal limits    Narrative:     Preferred Collection Type->Urine, Clean Catch   PROTIME-INR   TROPONIN I   B-TYPE NATRIURETIC PEPTIDE        ECG Results          EKG 12-lead (Preliminary result)  Result time 01/11/19 08:42:29    ED Interpretation by Deirdre Odom MD (01/11/19 08:42:29)    Normal sinus rhythm, rate 89 beats per minute, normal ID interval, .  No ST elevation.  No T-wave inversions.                            Imaging Results          X-Ray Chest AP Portable (Final result)  Result time 01/11/19 10:04:13    Final result by Christian Blackwood MD (01/11/19 10:04:13)                 Impression:      Stable chest.  No  active process.      Electronically signed by: Christian Blackwood MD  Date:    01/11/2019  Time:    10:04             Narrative:    EXAMINATION:  XR CHEST AP PORTABLE    CLINICAL HISTORY:  fall;    TECHNIQUE:  Single frontal view of the chest was performed.    COMPARISON:  Two thousand eighteen    FINDINGS:  Heart normal.  Lungs clear.  Calcific bursitis changes left shoulder.                               X-Ray Knee 1 or 2 View Right (Final result)  Result time 01/11/19 10:03:29    Final result by Tony Singer Jr., MD (01/11/19 10:03:29)                 Impression:      No acute abnormality.  Degenerative change noted.      Electronically signed by: Tony Singer MD  Date:    01/11/2019  Time:    10:03             Narrative:    EXAMINATION:  XR KNEE 1 OR 2 VIEW RIGHT    CLINICAL HISTORY:  knee pain;    TECHNIQUE:  AP and lateral views of the right knee were performed.    COMPARISON:  None    FINDINGS:  Vascular calcifications noted.  Bones are demineralized.  No fracture or joint effusion.                               X-Ray Hip 2 View Right (Final result)  Result time 01/11/19 10:03:33    Final result by Christian Blackwood MD (01/11/19 10:03:33)                 Impression:      No fracture dislocation.  No hardware failure left hip.      Electronically signed by: Christian Blackwood MD  Date:    01/11/2019  Time:    10:03             Narrative:    EXAMINATION:  XR HIP 2 VIEW RIGHT    CLINICAL HISTORY:  Pain in unspecified hip    TECHNIQUE:  AP view of the pelvis and frog leg lateral view of the right hip were performed.    COMPARISON:  Two thousand eighteen    FINDINGS:  Left total hip prostatic surgery stable.  Degenerative disc spondylosis facet arthropathy, levoscoliosis lumbar spine stable.  Right hip joint appears normal.                               CT Head Without Contrast (Final result)  Result time 01/11/19 08:54:36    Final result by Lily Schreiber MD (01/11/19 08:54:36)                  Impression:      No acute intracranial abnormality.    Focal hypoattenuation in the right, likely represents age-indeterminate lacunar infarct.    Chronic microangiopathic ischemic changes and diffuse cerebral volume loss, similar to prior exam.      Electronically signed by: Lily Schreiber  Date:    01/11/2019  Time:    08:54             Narrative:    EXAMINATION:  CT HEAD WITHOUT CONTRAST    CLINICAL HISTORY:  Head trauma, minor, GCS>=13, NOC/NEXUS/CCR positive, first study;    TECHNIQUE:  Low dose axial CT images obtained throughout the head without intravenous contrast. Sagittal and coronal reconstructions were performed.    COMPARISON:  Multiple priors, most recent 10/08/2016    FINDINGS:  Intracranial compartment:    Diffuse cerebral volume loss with ex vacuo dilatation of the ventricles which are unchanged in size in caliber compared to prior exam.  No extra-axial blood or fluid collections.    Mild periventricular white matter hypoattenuation, unchanged.  Focal hypoattenuation in the right thalamus.  No parenchymal mass, hemorrhage, edema or major vascular distribution infarct.    Skull/extracranial contents (limited evaluation): No fracture.  Atherosclerotic calcification of the cavernous internal carotid arteries.  Partial right mastoid effusion and mucosal opacification of posterior ethmoid sinuses, similar to prior exam.                                 Medical Decision Making:   Differential Diagnosis:   89-year-old female presents with right-sided mouth pain after fall.  She denies LOC, she does report hitting the right side of her face.  She reports pain to her right hip and right knee.  She is unsure how long she was down but states that she fell this morning.  She denies any chest pain or shortness of breath. No nausea or vomiting. Her exam is notable for a contusion to the right upper lip with no laceration.  She has normal range of motion of her right hip and right knee without any obvious deformity.   Workup initiated with plain films of her right lower extremity, CT head, will get screening labs, EKG.  On chart review, patient noted to have multiple falls in the past.                   ED Course as of Jan 11 1034   Fri Jan 11, 2019   1012 Patient and her daughter who is at bedside updated regarding lab results, concerning for urinary tract infection.  Will give Rocephin here and plan to discharge with Keflex.  [LH]      ED Course User Index  [LH] Deirdre Odom MD     Clinical Impression:   The primary encounter diagnosis was Urinary tract infection without hematuria, site unspecified. Diagnoses of Fall, Hip pain, and Contusion of lip, initial encounter were also pertinent to this visit.                             Deirdre Odom MD  01/11/19 1033

## 2019-01-21 DIAGNOSIS — I10 ESSENTIAL HYPERTENSION: ICD-10-CM

## 2019-01-21 RX ORDER — CARBIDOPA AND LEVODOPA 25; 100 MG/1; MG/1
TABLET ORAL
Qty: 270 TABLET | Refills: 0 | Status: SHIPPED | OUTPATIENT
Start: 2019-01-21 | End: 2019-02-08 | Stop reason: SDUPTHER

## 2019-01-23 ENCOUNTER — TELEPHONE (OUTPATIENT)
Dept: FAMILY MEDICINE | Facility: CLINIC | Age: 84
End: 2019-01-23

## 2019-01-23 DIAGNOSIS — F02.818 DEMENTIA ASSOCIATED WITH OTHER UNDERLYING DISEASE WITH BEHAVIORAL DISTURBANCE: Primary | ICD-10-CM

## 2019-01-23 NOTE — TELEPHONE ENCOUNTER
"----- Message from Loli Tovar sent at 1/23/2019  2:04 PM CST -----  Contact: Rosmery "daughter" [phone]  Requesting a referral to neurologist   "

## 2019-01-29 ENCOUNTER — PES CALL (OUTPATIENT)
Dept: ADMINISTRATIVE | Facility: CLINIC | Age: 84
End: 2019-01-29

## 2019-02-06 RX ORDER — LOVASTATIN 40 MG/1
TABLET ORAL
Qty: 90 TABLET | Refills: 0 | Status: SHIPPED | OUTPATIENT
Start: 2019-02-06 | End: 2019-05-06 | Stop reason: SDUPTHER

## 2019-02-08 ENCOUNTER — OFFICE VISIT (OUTPATIENT)
Dept: NEUROLOGY | Facility: CLINIC | Age: 84
End: 2019-02-08
Payer: MEDICARE

## 2019-02-08 VITALS
WEIGHT: 164 LBS | DIASTOLIC BLOOD PRESSURE: 76 MMHG | SYSTOLIC BLOOD PRESSURE: 139 MMHG | BODY MASS INDEX: 28 KG/M2 | HEART RATE: 75 BPM | HEIGHT: 64 IN

## 2019-02-08 DIAGNOSIS — G20.A1 PARKINSON'S DISEASE: ICD-10-CM

## 2019-02-08 DIAGNOSIS — F03.90 DEMENTIA WITHOUT BEHAVIORAL DISTURBANCE, UNSPECIFIED DEMENTIA TYPE: Primary | ICD-10-CM

## 2019-02-08 PROCEDURE — 99214 PR OFFICE/OUTPT VISIT, EST, LEVL IV, 30-39 MIN: ICD-10-PCS | Mod: HCNC,S$GLB,, | Performed by: PSYCHIATRY & NEUROLOGY

## 2019-02-08 PROCEDURE — 1101F PT FALLS ASSESS-DOCD LE1/YR: CPT | Mod: HCNC,CPTII,S$GLB, | Performed by: PSYCHIATRY & NEUROLOGY

## 2019-02-08 PROCEDURE — 99999 PR PBB SHADOW E&M-EST. PATIENT-LVL IV: ICD-10-PCS | Mod: PBBFAC,HCNC,, | Performed by: PSYCHIATRY & NEUROLOGY

## 2019-02-08 PROCEDURE — 1101F PR PT FALLS ASSESS DOC 0-1 FALLS W/OUT INJ PAST YR: ICD-10-PCS | Mod: HCNC,CPTII,S$GLB, | Performed by: PSYCHIATRY & NEUROLOGY

## 2019-02-08 PROCEDURE — 99214 OFFICE O/P EST MOD 30 MIN: CPT | Mod: HCNC,S$GLB,, | Performed by: PSYCHIATRY & NEUROLOGY

## 2019-02-08 PROCEDURE — 99999 PR PBB SHADOW E&M-EST. PATIENT-LVL IV: CPT | Mod: PBBFAC,HCNC,, | Performed by: PSYCHIATRY & NEUROLOGY

## 2019-02-08 RX ORDER — CARBIDOPA AND LEVODOPA 25; 100 MG/1; MG/1
TABLET ORAL
Qty: 120 TABLET | Refills: 3 | Status: SHIPPED | OUTPATIENT
Start: 2019-02-08 | End: 2019-03-22

## 2019-02-08 NOTE — LETTER
February 8, 2019      Marian Sanford MD  [address] [place] [address]           [facility] [address] [facility] [address]  [place] [address]  Phone: [phone]  Fax: [phone]          Patient: Yanira More   MR Number: [id number]   YOB: 1925   Date of Visit: 2/8/2019       Dear Dr. Marian Sanford:    Thank you for referring Yanira More to me for evaluation. Attached you will find relevant portions of my assessment and plan of care.    If you have questions, please do not hesitate to call me. I look forward to following Yanira More along with you.    Sincerely,    Sylvie Vasquez,     Enclosure  CC:  No Recipients    If you would like to receive this communication electronically, please contact [email or web address] or [phone] to request more information on [facility] access.    For providers and/or their staff who would like to refer a patient to [facility], please contact us through our one-stop-shop provider referral line, [facility], at [phone].    If you feel you have received this communication in error or would no longer like to receive these types of communications, please e-mail [email or web address]

## 2019-02-08 NOTE — PROGRESS NOTES
Neurology Consult Note    Chief Complaint: tremor and slowly progressive problems with memory    HPI:   Yanira More is a 89 y.o. woman with multiple comorbidities as outlined below who presents for further evaluation of slowly progressive problems with memory as well as tremor. She is accompanied to the appointment by her daughter who aided in giving history. She states that her mother has been wheelchair bound for the past 2-3 years after falling and breaking her hip. She states that her mother has had slowly progressive problems with memory for the past two years, but more noticeable over the past year. She states that about two years ago, she also noticed that patient had tremor in her hands when trying to eat or use her hands. She was started on sinemet a few years ago after going to the hospital, but never followed up with a neurologist after this. Daughter states that sinemet seems to help with the symptoms and reports that patient is tolerating the medication well. She states prior to her mother breaking her hip, she had trouble with walking and would take short steps. She states that at times, her mother forgets and tries to stand on her own and will fall. She denies her hitting her head. She states it takes two people to help her stand and she is unable to stand on her own. She reports her mother can hold regular conversation, but at times talks about things that are not happening. She states sometimes patient becomes frustrated, but she denies her being difficult to control or violence. She has never been on medication for dementia. She states she is blind from glaucoma and is unable to write. She states she can see movements, but not more than this. She has difficulty buttoning shirts or getting dressed on her own. She needs help with mostly all ADLs. She denies difficulty with swallowing.    Past Medical History:  Past Medical History:   Diagnosis Date    Arthritis     osteoarthritis of the  knees    Blind in both eyes     Chronic low back pain     with spinal stenosis at L4-L5    Degenerative disc disease     spinal stenosis at L4-L5    Depression     Diabetes mellitus type II     Essential tremor     Glaucoma     with legal blindness    History of shingles     was some mild postherpetic neuralgia    Hyperlipidemia     Hypertension        Past Surgical History:  Past Surgical History:   Procedure Laterality Date    HEMIARTHROPLASTY Left 11/26/2016    [facility] [place]    HYSTERECTOMY         Social History:  Social History     Socioeconomic History    Marital status:      Spouse name: Not on file    Number of children: Not on file    Years of education: Not on file    Highest education level: Not on file   Social Needs    Financial resource strain: Not on file    Food insecurity - worry: Not on file    Food insecurity - inability: Not on file    Transportation needs - medical: Not on file    Transportation needs - non-medical: Not on file   Occupational History    Not on file   Tobacco Use    Smoking status: Never Smoker    Smokeless tobacco: Never Used   Substance and Sexual Activity    Alcohol use: No    Drug use: No    Sexual activity: No   Other Topics Concern    Not on file   Social History Narrative    Lives with her daughter Rosmery Catalan who has power of        Family History:  Family History   Problem Relation Age of Onset    Hypertension Sister     Diabetes Daughter     Early death Sister 50        reports natural causes       Medications:  Current Outpatient Medications   Medication Sig Dispense Refill    alendronate (FOSAMAX) 70 MG tablet Take 1 tablet (70 mg total) by mouth every 7 days. on Monday 12 tablet 1    amLODIPine (NORVASC) 5 MG tablet TAKE 1 TABLET BY MOUTH ONCE DAILY 90 tablet 0    benazepril (LOTENSIN) 10 MG tablet TAKE ONE TABLET BY MOUTH ONCE DAILY WITH  20MG  BENAZAPRIL 90 tablet 3    benazepril  (LOTENSIN) 20 MG tablet Take 1 tablet (20 mg total) by mouth once daily. 90 tablet 3    betamethasone dipropionate (DIPROLENE) 0.05 % cream APPLY  CREAM TOPICALLY TO AFFECTED AREA TWICE DAILY 45 g 0    blood sugar diagnostic Strp 1 strip by Misc.(Non-Drug; Combo Route) route 2 (two) times daily with meals. 100 strip 11    carbidopa-levodopa  mg (SINEMET)  mg per tablet Take 1 tab at 7 am, 12 pm, 5pm and 9pm 120 tablet 3    celecoxib (CELEBREX) 200 MG capsule TAKE 1 CAPSULE BY MOUTH ONCE DAILY 90 capsule 0    diclofenac sodium (VOLTAREN) 1 % Gel APPLY 2G TOPICALLY ONCE DAILY to right shoulder 100 g 1    dorzolamide-timolol 2-0.5% (COSOPT) 22.3-6.8 mg/mL ophthalmic solution Place 1 drop into both eyes 2 (two) times daily. 10 mL 3    lancets Misc 1 lancet by Misc.(Non-Drug; Combo Route) route 2 (two) times daily with meals. 100 each 11    latanoprost 0.005 % ophthalmic solution       lovastatin (MEVACOR) 40 MG tablet TAKE 1 TABLET BY MOUTH ONCE DAILY IN THE EVENING 90 tablet 0    metFORMIN (GLUCOPHAGE-XR) 500 MG 24 hr tablet TAKE 1 TABLET BY MOUTH ONCE DAILY 90 tablet 0    multivitamin capsule Take 1 capsule by mouth once daily.      timolol maleate 0.25% (TIMOPTIC) 0.25 % Drop       blood-glucose meter kit Use as instructed 1 each 0    lancing device Misc 1 Device by Misc.(Non-Drug; Combo Route) route 2 (two) times daily with meals. 1 each 0     No current facility-administered medications for this visit.        Allergies:  Review of patient's allergies indicates:  No Known Allergies    ROS:  As per HPI. Limited as patient has dementia    Physical Exam  Vitals:    02/08/19 0939   BP: 139/76   Pulse: 75       General: well nourished, well developed  Eyes: no scleral icterus   Nose: nasal turbinates intact  Neck: supple, ROM intact  Skin: no rash or ecchymosis  Joints: no swelling or erythema  Cardiac: regular rate and rhythm  Lungs: clear to auscultation bilaterally    Neuro:  Mental status:  awake, alert, oriented to person and place, unable to state day, month, or year, speaks in full sentences, difficult to assess naming as patient legally blind MMSE limited due to patient legally blind MMSE at least 7/30  CN: EOMI, legally blind, V1-V3 sensation intact, no facial asymmetry, hearing grossly intact  Motor:   RUE 5/5  RLE 5/5  LUE 5/5  LLE 5/5    Cogwheel rigidity at bilateral wrists, R > L  Right hand moderate amplitude, low frequency tremor at rest    Reflexes: 2+ bilateral biceps and brachioradialis, 1+ bilateral knee jerks and ankle jerks, toes equivocal bilaterally  Sensory: intact to light touch throughout  Coordination: unable to test as patient legally blind  Gait: wheelchair bound    Prior Imaging/Labs:    CT HEAD WITHOUT CONTRAST    CLINICAL HISTORY:  Head trauma, minor, GCS>=13, NOC/NEXUS/CCR positive, first study;    TECHNIQUE:  Low dose axial CT images obtained throughout the head without intravenous contrast. Sagittal and coronal reconstructions were performed.    COMPARISON:  Multiple priors, most recent 10/08/2016    FINDINGS:  Intracranial compartment:    Diffuse cerebral volume loss with ex vacuo dilatation of the ventricles which are unchanged in size in caliber compared to prior exam.  No extra-axial blood or fluid collections.    Mild periventricular white matter hypoattenuation, unchanged.  Focal hypoattenuation in the right thalamus.  No parenchymal mass, hemorrhage, edema or major vascular distribution infarct.    Skull/extracranial contents (limited evaluation): No fracture.  Atherosclerotic calcification of the cavernous internal carotid arteries.  Partial right mastoid effusion and mucosal opacification of posterior ethmoid sinuses, similar to prior exam.      Impression       No acute intracranial abnormality.         Assessment and Plan:  89 yo woman with pmhx of SDH 4 years ago, slowly progressive problems with memory and bilateral hand tremor and rigidity for 2 years.  Given report of taking short steps prior to being wheelchair bound after breaking hip and bilateral hand tremor worse on right, observed at rest on right on examination today, patient likely has Parkinson's disease, her problems with memory are likely due to a comorbid dementia. She does not become violent or difficult to control at home.    1) Bilateral hand tremor likely due to Parkinson's disease  Increase sinemet to 25-100mg four times a day ( take at 7 am, 12pm, 5pm and 9 pm) - patient's daughter was made aware of side effects and was asked to notify me if patient experiences any    2) Moderate to severe dementia  Will consider aricept at next visit, as I would like to make one medication change at a time  Referral to social work as daughter would like help trying to find help at home if possible    3) Lacunar infarct on CTH  Daughter states patient has fibroids, but otherwise has no problems with bleeding. She states she does not hit her head when she falls, she states she had a subdural a few years ago, but no new bleeding since then  We discussed risks vs benefits of starting aspirin for stroke prevention and daughter would like to try aspirin, she was advised to notify me if patient experiences more bleeding with fibroids while on this  Start aspirin 81mg daily for stroke prevention  C/w statin    Daughter was advised to notify me for worsening symptoms.    I will see her back in 6 weeks or sooner if necessary.      Sylvie Vasquez DO  [facility]  [address] [facility] [address]  [place] [id number]  [phone]

## 2019-02-20 RX ORDER — METFORMIN HYDROCHLORIDE 500 MG/1
TABLET, EXTENDED RELEASE ORAL
Qty: 90 TABLET | Refills: 0 | Status: SHIPPED | OUTPATIENT
Start: 2019-02-20

## 2019-03-04 DIAGNOSIS — I10 ESSENTIAL HYPERTENSION: ICD-10-CM

## 2019-03-04 RX ORDER — AMLODIPINE BESYLATE 5 MG/1
TABLET ORAL
Qty: 90 TABLET | Refills: 0 | Status: SHIPPED | OUTPATIENT
Start: 2019-03-04

## 2019-03-22 ENCOUNTER — OFFICE VISIT (OUTPATIENT)
Dept: NEUROLOGY | Facility: CLINIC | Age: 84
End: 2019-03-22
Payer: MEDICARE

## 2019-03-22 VITALS
BODY MASS INDEX: 28.15 KG/M2 | SYSTOLIC BLOOD PRESSURE: 136 MMHG | HEART RATE: 82 BPM | RESPIRATION RATE: 18 BRPM | HEIGHT: 64 IN | DIASTOLIC BLOOD PRESSURE: 72 MMHG | TEMPERATURE: 99 F

## 2019-03-22 DIAGNOSIS — F02.818 DEMENTIA DUE TO PARKINSON'S DISEASE WITH BEHAVIORAL DISTURBANCE: ICD-10-CM

## 2019-03-22 DIAGNOSIS — G20.A1 PARKINSON'S DISEASE: Primary | ICD-10-CM

## 2019-03-22 DIAGNOSIS — G20.A1 DEMENTIA DUE TO PARKINSON'S DISEASE WITH BEHAVIORAL DISTURBANCE: ICD-10-CM

## 2019-03-22 PROCEDURE — 99214 OFFICE O/P EST MOD 30 MIN: CPT | Mod: HCNC,S$GLB,, | Performed by: PSYCHIATRY & NEUROLOGY

## 2019-03-22 PROCEDURE — 99999 PR PBB SHADOW E&M-EST. PATIENT-LVL IV: CPT | Mod: PBBFAC,HCNC,, | Performed by: PSYCHIATRY & NEUROLOGY

## 2019-03-22 PROCEDURE — 99499 RISK ADDL DX/OHS AUDIT: ICD-10-PCS | Mod: HCNC,S$GLB,, | Performed by: PSYCHIATRY & NEUROLOGY

## 2019-03-22 PROCEDURE — 99214 PR OFFICE/OUTPT VISIT, EST, LEVL IV, 30-39 MIN: ICD-10-PCS | Mod: HCNC,S$GLB,, | Performed by: PSYCHIATRY & NEUROLOGY

## 2019-03-22 PROCEDURE — 99999 PR PBB SHADOW E&M-EST. PATIENT-LVL IV: ICD-10-PCS | Mod: PBBFAC,HCNC,, | Performed by: PSYCHIATRY & NEUROLOGY

## 2019-03-22 PROCEDURE — 99499 UNLISTED E&M SERVICE: CPT | Mod: HCNC,S$GLB,, | Performed by: PSYCHIATRY & NEUROLOGY

## 2019-03-22 PROCEDURE — 1101F PR PT FALLS ASSESS DOC 0-1 FALLS W/OUT INJ PAST YR: ICD-10-PCS | Mod: HCNC,CPTII,S$GLB, | Performed by: PSYCHIATRY & NEUROLOGY

## 2019-03-22 PROCEDURE — 1101F PT FALLS ASSESS-DOCD LE1/YR: CPT | Mod: HCNC,CPTII,S$GLB, | Performed by: PSYCHIATRY & NEUROLOGY

## 2019-03-22 RX ORDER — CARBIDOPA AND LEVODOPA 25; 100 MG/1; MG/1
1 TABLET ORAL 3 TIMES DAILY
Qty: 90 TABLET | Refills: 11 | Status: SHIPPED | OUTPATIENT
Start: 2019-03-22 | End: 2020-03-21

## 2019-03-22 RX ORDER — DONEPEZIL HYDROCHLORIDE 5 MG/1
5 TABLET, FILM COATED ORAL NIGHTLY
Qty: 30 TABLET | Refills: 2 | Status: SHIPPED | OUTPATIENT
Start: 2019-03-22

## 2019-03-23 NOTE — PROGRESS NOTES
Neurology Follow Up Note    Chief Complaint: follow up for dementia    Interval History:  Since last visit, patient's daughter states she feels she has had more episodes of agitation, however, she feels her memory has been stable. There have been no falls at home. She denies her becoming violent or difficult to control, but she states she is resistant to listening to change or take showers at times. She feels that her tremors have not improved since increasing sinemet. She is tolerating aspirin well. She denies patient having a history of seizures. She has no further complaints.     Initial Visit 2/8/19:  Yanira More is a 89 y.o. woman with multiple comorbidities as outlined below who presents for further evaluation of slowly progressive problems with memory as well as tremor. She is accompanied to the appointment by her daughter who aided in giving history. She states that her mother has been wheelchair bound for the past 2-3 years after falling and breaking her hip. She states that her mother has had slowly progressive problems with memory for the past two years, but more noticeable over the past year. She states that about two years ago, she also noticed that patient had tremor in her hands when trying to eat or use her hands. She was started on sinemet a few years ago after going to the hospital, but never followed up with a neurologist after this. Daughter states that sinemet seems to help with the symptoms and reports that patient is tolerating the medication well. She states prior to her mother breaking her hip, she had trouble with walking and would take short steps. She states that at times, her mother forgets and tries to stand on her own and will fall. She denies her hitting her head. She states it takes two people to help her stand and she is unable to stand on her own. She reports her mother can hold regular conversation, but at times talks about things that are not happening. She states  sometimes patient becomes frustrated, but she denies her being difficult to control or violence. She has never been on medication for dementia. She states she is blind from glaucoma and is unable to write. She states she can see movements, but not more than this. She has difficulty buttoning shirts or getting dressed on her own. She needs help with mostly all ADLs. She denies difficulty with swallowing.        Past Medical History:  Past Medical History:   Diagnosis Date    Arthritis     osteoarthritis of the knees    Blind in both eyes     Chronic low back pain     with spinal stenosis at L4-L5    Degenerative disc disease     spinal stenosis at L4-L5    Depression     Diabetes mellitus type II     Essential tremor     Glaucoma     with legal blindness    History of shingles     was some mild postherpetic neuralgia    Hyperlipidemia     Hypertension        Past Surgical History:  Past Surgical History:   Procedure Laterality Date    HEMIARTHROPLASTY Left 11/26/2016    Performed by [facility]anco [facility] at [facility] OR    HYSTERECTOMY         Social History:  Social History     Socioeconomic History    Marital status:      Spouse name: Not on file    Number of children: Not on file    Years of education: Not on file    Highest education level: Not on file   Social Needs    Financial resource strain: Not on file    Food insecurity - worry: Not on file    Food insecurity - inability: Not on file    Transportation needs - medical: Not on file    Transportation needs - non-medical: Not on file   Occupational History    Not on file   Tobacco Use    Smoking status: Never Smoker    Smokeless tobacco: Never Used   Substance and Sexual Activity    Alcohol use: No    Drug use: No    Sexual activity: No   Other Topics Concern    Not on file   Social History Narrative    Lives with her daughter Rosmery Catalan who has power of        Family History:  Family History   Problem Relation Age  of Onset    Hypertension Sister     Diabetes Daughter     Early death Sister 50        reports natural causes       Medications:  Current Outpatient Medications   Medication Sig Dispense Refill    alendronate (FOSAMAX) 70 MG tablet Take 1 tablet (70 mg total) by mouth every 7 days. on Monday 12 tablet 1    amLODIPine (NORVASC) 5 MG tablet TAKE 1 TABLET BY MOUTH ONCE DAILY 90 tablet 0    benazepril (LOTENSIN) 10 MG tablet TAKE ONE TABLET BY MOUTH ONCE DAILY WITH  20MG  BENAZAPRIL 90 tablet 3    benazepril (LOTENSIN) 20 MG tablet Take 1 tablet (20 mg total) by mouth once daily. 90 tablet 3    betamethasone dipropionate (DIPROLENE) 0.05 % cream APPLY  CREAM TOPICALLY TO AFFECTED AREA TWICE DAILY 45 g 0    blood sugar diagnostic Strp 1 strip by Misc.(Non-Drug; Combo Route) route 2 (two) times daily with meals. 100 strip 11    celecoxib (CELEBREX) 200 MG capsule TAKE 1 CAPSULE BY MOUTH ONCE DAILY 90 capsule 0    diclofenac sodium (VOLTAREN) 1 % Gel APPLY 2G TOPICALLY ONCE DAILY to right shoulder 100 g 1    dorzolamide-timolol 2-0.5% (COSOPT) 22.3-6.8 mg/mL ophthalmic solution Place 1 drop into both eyes 2 (two) times daily. 10 mL 3    lancets Misc 1 lancet by Misc.(Non-Drug; Combo Route) route 2 (two) times daily with meals. 100 each 11    latanoprost 0.005 % ophthalmic solution       lovastatin (MEVACOR) 40 MG tablet TAKE 1 TABLET BY MOUTH ONCE DAILY IN THE EVENING 90 tablet 0    metFORMIN (GLUCOPHAGE-XR) 500 MG 24 hr tablet TAKE 1 TABLET BY MOUTH ONCE DAILY 90 tablet 0    multivitamin capsule Take 1 capsule by mouth once daily.      timolol maleate 0.25% (TIMOPTIC) 0.25 % Drop       blood-glucose meter kit Use as instructed 1 each 0    carbidopa-levodopa  mg (SINEMET)  mg per tablet Take 1 tablet by mouth 3 (three) times daily. 90 tablet 11    donepezil (ARICEPT) 5 MG tablet Take 1 tablet (5 mg total) by mouth every evening. 30 tablet 2    lancing device Misc 1 Device by  Misc.(Non-Drug; Combo Route) route 2 (two) times daily with meals. 1 each 0     No current facility-administered medications for this visit.        Allergies:  Review of patient's allergies indicates:  No Known Allergies    ROS:  A 12 point review of system was negative aside from pertinent positives and negatives as outlined above.    Physical Exam  Vitals:    03/22/19 0930   BP: 136/72   Pulse: 82   Resp: 18   Temp: 98.8 °F (37.1 °C)       General: well nourished, well developed  Eyes: no scleral icterus   Nose: nasal turbinates intact  Neck: supple, ROM intact  Skin: no rash or ecchymosis  Joints: no swelling or erythema  Cardiac: regular rate and rhythm  Lungs: clear to auscultation bilaterally    Neuro:  Mental status: awake, alert, oriented to person and place, unable to state day, month, or year, speaks in full sentences, difficult to assess naming as patient legally blind MMSE limited due to patient legally blind   CN: EOMI, legally blind, V1-V3 sensation intact, no facial asymmetry, hearing grossly intact  Motor:   RUE 5/5  RLE 5/5  LUE 5/5  LLE 5/5     Cogwheel rigidity at bilateral wrists, R > L       Reflexes: 2+ bilateral biceps and brachioradialis, 1+ bilateral knee jerks and ankle jerks, toes equivocal bilaterally  Sensory: intact to light touch throughout  Coordination: unable to test as patient legally blind  Gait: in wheelchair, needs assistance to stand    Prior Imaging/Labs:  Reviewed    Assessment and Plan:    89 y.o. female with moderate dementia and Parkinson's .    1. Parkinson's disease  Decrease sinemet back down to three times a day as concern that increase may have caused increased episodes of confusion and agitation  - carbidopa-levodopa  mg (SINEMET)  mg per tablet; Take 1 tablet by mouth 3 (three) times daily.  Dispense: 90 tablet; Refill: 11    2. Dementia due to Parkinson's disease with behavioral disturbance    - donepezil (ARICEPT) 5 MG tablet; Take 1 tablet (5 mg  total) by mouth every evening.  Dispense: 30 tablet; Refill: 2    Side effects of aricept were explained to daughter and she was advised to notify me if patient experiences any        Patient's daughter was advised to notify me for worsening symptoms. I will see patient back in 3 months or sooner if necessary.       Sylvie Vasquez DO  [facility]  [address] [facility] [address]  [place] [id number] [phone]

## 2019-03-27 ENCOUNTER — HOSPITAL ENCOUNTER (EMERGENCY)
Facility: HOSPITAL | Age: 84
Discharge: HOME | End: 2019-03-28
Attending: EMERGENCY MEDICINE
Payer: MEDICARE

## 2019-03-27 ENCOUNTER — TELEPHONE (OUTPATIENT)
Dept: NEUROLOGY | Facility: CLINIC | Age: 84
End: 2019-03-27

## 2019-03-27 DIAGNOSIS — R41.82 ALTERED MENTAL STATUS: ICD-10-CM

## 2019-03-27 DIAGNOSIS — G20.A1 DEMENTIA DUE TO PARKINSON'S DISEASE WITH BEHAVIORAL DISTURBANCE: Primary | ICD-10-CM

## 2019-03-27 DIAGNOSIS — F02.818 DEMENTIA DUE TO PARKINSON'S DISEASE WITH BEHAVIORAL DISTURBANCE: Primary | ICD-10-CM

## 2019-03-27 PROCEDURE — 99285 EMERGENCY DEPT VISIT HI MDM: CPT | Mod: HCNC

## 2019-03-27 RX ORDER — QUETIAPINE FUMARATE 25 MG/1
TABLET, FILM COATED ORAL
Qty: 30 TABLET | Refills: 3 | Status: SHIPPED | OUTPATIENT
Start: 2019-03-27

## 2019-03-27 NOTE — PROGRESS NOTES
Spoke to patient's daughter and she feels that patient is not sleeping at night and banging on doors. She is becoming difficult to control at home. Will prescribe seroquel 25mg at bedtime. She was advised to give her half a tablet every night for a week, or 1 tablet every other night for a week. If she is tolerating well, she can increase to 1 tab every night. She was made aware this can cause worsening gait and may worsen Parkinson's symptoms so she was advised to watch for this and notify me if this occurs. She was advised to stop aricept as it does not seem to be helping and would like to only start one new medication at a time. Will have staff contact her regarding social work referral as daughter would like help to get patient into a nursing home.    Sylvie Vasquez DO

## 2019-03-27 NOTE — TELEPHONE ENCOUNTER
First day the donepezil working but now the medication seems it is not helphing.        ----- Message from Candice Larios sent at 3/27/2019 11:26 AM CDT -----  Contact: Rosmery/ Daughter/ [phone]  Patient is still have a lot of dementia problems, the medication isn't working.  Thank you.

## 2019-03-28 ENCOUNTER — TELEPHONE (OUTPATIENT)
Dept: NEUROLOGY | Facility: CLINIC | Age: 84
End: 2019-03-28

## 2019-03-28 VITALS
DIASTOLIC BLOOD PRESSURE: 84 MMHG | HEIGHT: 64 IN | HEART RATE: 80 BPM | RESPIRATION RATE: 17 BRPM | TEMPERATURE: 99 F | BODY MASS INDEX: 28 KG/M2 | WEIGHT: 164 LBS | OXYGEN SATURATION: 98 % | SYSTOLIC BLOOD PRESSURE: 173 MMHG

## 2019-03-28 LAB
ALBUMIN SERPL BCP-MCNC: 4.2 G/DL (ref 3.5–5.2)
ALP SERPL-CCNC: 49 U/L (ref 55–135)
ALT SERPL W/O P-5'-P-CCNC: <5 U/L (ref 10–44)
ANION GAP SERPL CALC-SCNC: 9 MMOL/L (ref 8–16)
AST SERPL-CCNC: 18 U/L (ref 10–40)
BASOPHILS # BLD AUTO: 0.01 K/UL (ref 0–0.2)
BASOPHILS NFR BLD: 0.1 % (ref 0–1.9)
BILIRUB SERPL-MCNC: 0.3 MG/DL (ref 0.1–1)
BILIRUB UR QL STRIP: NEGATIVE
BUN SERPL-MCNC: 18 MG/DL (ref 10–30)
CALCIUM SERPL-MCNC: 10.4 MG/DL (ref 8.7–10.5)
CHLORIDE SERPL-SCNC: 107 MMOL/L (ref 95–110)
CLARITY UR: CLEAR
CO2 SERPL-SCNC: 25 MMOL/L (ref 23–29)
COLOR UR: ABNORMAL
CREAT SERPL-MCNC: 0.8 MG/DL (ref 0.5–1.4)
DIFFERENTIAL METHOD: NORMAL
EOSINOPHIL # BLD AUTO: 0.1 K/UL (ref 0–0.5)
EOSINOPHIL NFR BLD: 1.5 % (ref 0–8)
ERYTHROCYTE [DISTWIDTH] IN BLOOD BY AUTOMATED COUNT: 13.9 % (ref 11.5–14.5)
EST. GFR  (AFRICAN AMERICAN): >60 ML/MIN/1.73 M^2
EST. GFR  (NON AFRICAN AMERICAN): >60 ML/MIN/1.73 M^2
GLUCOSE SERPL-MCNC: 98 MG/DL (ref 70–110)
GLUCOSE UR QL STRIP: NEGATIVE
HCT VFR BLD AUTO: 37.8 % (ref 37–48.5)
HGB BLD-MCNC: 12.4 G/DL (ref 12–16)
HGB UR QL STRIP: ABNORMAL
KETONES UR QL STRIP: NEGATIVE
LACTATE SERPL-SCNC: 1.7 MMOL/L (ref 0.5–2.2)
LEUKOCYTE ESTERASE UR QL STRIP: NEGATIVE
LYMPHOCYTES # BLD AUTO: 3 K/UL (ref 1–4.8)
LYMPHOCYTES NFR BLD: 42.2 % (ref 18–48)
MAGNESIUM SERPL-MCNC: 2.1 MG/DL (ref 1.6–2.6)
MCH RBC QN AUTO: 30.8 PG (ref 27–31)
MCHC RBC AUTO-ENTMCNC: 32.8 G/DL (ref 32–36)
MCV RBC AUTO: 94 FL (ref 82–98)
MICROSCOPIC COMMENT: NORMAL
MONOCYTES # BLD AUTO: 0.5 K/UL (ref 0.3–1)
MONOCYTES NFR BLD: 7.1 % (ref 4–15)
NEUTROPHILS # BLD AUTO: 3.5 K/UL (ref 1.8–7.7)
NEUTROPHILS NFR BLD: 49.1 % (ref 38–73)
NITRITE UR QL STRIP: NEGATIVE
PH UR STRIP: 7 [PH] (ref 5–8)
PHOSPHATE SERPL-MCNC: 3.2 MG/DL (ref 2.7–4.5)
PLATELET # BLD AUTO: 207 K/UL (ref 150–350)
PMV BLD AUTO: 11.1 FL (ref 9.2–12.9)
POTASSIUM SERPL-SCNC: 4.1 MMOL/L (ref 3.5–5.1)
PROT SERPL-MCNC: 8.2 G/DL (ref 6–8.4)
PROT UR QL STRIP: NEGATIVE
RBC # BLD AUTO: 4.03 M/UL (ref 4–5.4)
RBC #/AREA URNS HPF: 2 /HPF (ref 0–4)
SODIUM SERPL-SCNC: 141 MMOL/L (ref 136–145)
SP GR UR STRIP: 1.01 (ref 1–1.03)
TROPONIN I SERPL DL<=0.01 NG/ML-MCNC: <0.006 NG/ML (ref 0–0.03)
URN SPEC COLLECT METH UR: ABNORMAL
UROBILINOGEN UR STRIP-ACNC: NEGATIVE EU/DL
WBC # BLD AUTO: 7.16 K/UL (ref 3.9–12.7)

## 2019-03-28 PROCEDURE — 83735 ASSAY OF MAGNESIUM: CPT | Mod: HCNC

## 2019-03-28 PROCEDURE — 93010 ELECTROCARDIOGRAM REPORT: CPT | Mod: HCNC,,, | Performed by: INTERNAL MEDICINE

## 2019-03-28 PROCEDURE — 84100 ASSAY OF PHOSPHORUS: CPT | Mod: HCNC

## 2019-03-28 PROCEDURE — 87040 BLOOD CULTURE FOR BACTERIA: CPT | Mod: 59,HCNC

## 2019-03-28 PROCEDURE — 93005 ELECTROCARDIOGRAM TRACING: CPT | Mod: HCNC

## 2019-03-28 PROCEDURE — 85025 COMPLETE CBC W/AUTO DIFF WBC: CPT | Mod: HCNC

## 2019-03-28 PROCEDURE — 83605 ASSAY OF LACTIC ACID: CPT | Mod: HCNC

## 2019-03-28 PROCEDURE — 80053 COMPREHEN METABOLIC PANEL: CPT | Mod: HCNC

## 2019-03-28 PROCEDURE — 81000 URINALYSIS NONAUTO W/SCOPE: CPT | Mod: HCNC

## 2019-03-28 PROCEDURE — 93010 EKG 12-LEAD: ICD-10-PCS | Mod: HCNC,,, | Performed by: INTERNAL MEDICINE

## 2019-03-28 PROCEDURE — 84484 ASSAY OF TROPONIN QUANT: CPT | Mod: HCNC

## 2019-03-28 NOTE — DISCHARGE INSTRUCTIONS
You were seen in the emergency department for altered mental status and confusion.  Your labs, head CT, and exam are all reassuring.  It is unclear what the cause is at this time.  We do not believe this anything immediately dangerous.  Please follow up with her primary care provider or your neurologist in the next day or 2.  Please return for any new or worsening fevers, chills, nausea, vomiting, shortness of breath, numbness, weakness, changes in vision, or you become concerned in any other way.

## 2019-03-28 NOTE — ED TRIAGE NOTES
Pt cc Altered Mental Status Pt family reports pt has not slept all day, exhibiting confused behavior, and reports hallucinations; daughter reports she's noticed the pt's urine is malodorous; reports hx dementia and parkinson's

## 2019-03-28 NOTE — ED PROVIDER NOTES
Encounter Date: 3/27/2019    SCRIBE #1 NOTE: I, Paola Barba, am scribing for, and in the presence of,  Jeff Meyer MD. I have scribed the following portions of the note - Other sections scribed: HPI, ROS, PE.       History     Chief Complaint   Patient presents with    Altered Mental Status     family reports pt has not slept all day, exhibiting confused behavior, and reports hallucinations; daughter reports she's noticed the pt's urine is malodorous; reports hx dementia and parkinson's     CC: Altered Mental Status    HPI: This is a 89 y/o female with PMHx of Arthritis, Blind in both eyes, DDD, Dementia, Depression, DM, Essential tremor, Glaucoma, History of shingles, HLD, HTN, and Parkinson's disorder who presents to the ED for emergent evaluation of altered mental status that began earlier yesterday. Pt's daughter reports that pt had been confused all day yesterday and not sleeping. She states that pt was having conversations and pointing at people that were not there. Daughter reports that pt had s/s in the past and was diagnosed with a UTI. The antibiotics resolved her confusion. Denies fever, chills, nausea, vomiting, or further symptoms. Denies social hx.    Patient's neurologist decreased pt's Sinumet dosage back down to TID x2 days ago and also prescribed Aricept.    Neuro: Marisol-Maini    The history is provided by the patient and a relative. No  was used.     Review of patient's allergies indicates:  No Known Allergies  Past Medical History:   Diagnosis Date    Arthritis     osteoarthritis of the knees    Blind in both eyes     Chronic low back pain     with spinal stenosis at L4-L5    Degenerative disc disease     spinal stenosis at L4-L5    Depression     Diabetes mellitus type II     Essential tremor     Glaucoma     with legal blindness    History of shingles     was some mild postherpetic neuralgia    Hyperlipidemia     Hypertension      Past Surgical  History:   Procedure Laterality Date    HEMIARTHROPLASTY Left 11/26/2016    [facility]    HYSTERECTOMY       Family History   Problem Relation Age of Onset    Hypertension Sister     Diabetes Daughter     Early death Sister 50        reports natural causes     Social History     Tobacco Use    Smoking status: Never Smoker    Smokeless tobacco: Never Used   Substance Use Topics    Alcohol use: No    Drug use: No     Review of Systems   Constitutional: Positive for activity change (not sleeping). Negative for chills and fever.   HENT: Negative for congestion, ear pain, rhinorrhea and sore throat.    Eyes: Negative for pain and visual disturbance.   Respiratory: Negative for cough and shortness of breath.    Cardiovascular: Negative for chest pain.   Gastrointestinal: Negative for abdominal pain, diarrhea, nausea and vomiting.   Genitourinary: Negative for dysuria.   Musculoskeletal: Negative for back pain and neck pain.   Skin: Negative for rash.   Neurological: Negative for headaches.   Psychiatric/Behavioral: Positive for confusion and hallucinations.   All other systems reviewed and are negative.      Physical Exam     Initial Vitals [03/27/19 2349]   BP Pulse Resp Temp SpO2   (!) 158/77 85 18 98.8 °F (37.1 °C) 98 %      MAP       --         Physical Exam    Nursing note and vitals reviewed.  Constitutional: She appears well-developed and well-nourished. She is not diaphoretic.  Non-toxic appearance. She does not appear ill. No distress.   HENT:   Head: Normocephalic and atraumatic.   Nose: Nose normal.   Eyes: EOM are normal. Pupils are equal, round, and reactive to light. No scleral icterus.   Neck: Normal range of motion. Neck supple.   Cardiovascular: Normal rate, regular rhythm, normal heart sounds and intact distal pulses. Exam reveals no gallop and no friction rub.    No murmur heard.  Pulmonary/Chest: Effort normal and breath sounds normal. No stridor. No  respiratory distress. She has no wheezes. She has no rhonchi. She has no rales.   Abdominal: Soft. Normal appearance and bowel sounds are normal. She exhibits no distension. There is no tenderness. There is no rebound and no guarding.   Musculoskeletal: Normal range of motion. She exhibits no edema or tenderness.   Neurological: She is alert and oriented to person, place, and time. No cranial nerve deficit.   Skin: Skin is warm and dry. No rash noted.   Psychiatric: She has a normal mood and affect. Her behavior is normal.         ED Course   Procedures  Labs Reviewed   COMPREHENSIVE METABOLIC PANEL - Abnormal; Notable for the following components:       Result Value    Alkaline Phosphatase 49 (*)     ALT <5 (*)     All other components within normal limits   URINALYSIS, REFLEX TO URINE CULTURE - Abnormal; Notable for the following components:    Occult Blood UA 1+ (*)     All other components within normal limits    Narrative:     Preferred Collection Type->Urine, Clean Catch   CULTURE, BLOOD   CULTURE, BLOOD   CBC W/ AUTO DIFFERENTIAL   LACTIC ACID, PLASMA   MAGNESIUM   PHOSPHORUS   TROPONIN I   URINALYSIS MICROSCOPIC    Narrative:     Preferred Collection Type->Urine, Clean Catch          Imaging Results          X-Ray Chest AP Portable (Final result)  Result time 03/28/19 01:17:22    Final result by Azalia John MD (03/28/19 01:17:22)                 Impression:      No acute cardiopulmonary process identified.      Electronically signed by: Azalia John MD  Date:    03/28/2019  Time:    01:17             Narrative:    EXAMINATION:  XR CHEST AP PORTABLE    CLINICAL HISTORY:  Sepsis;    TECHNIQUE:  Single frontal view of the chest was performed.    COMPARISON:  01/11/2019.    FINDINGS:  Cardiac silhouette is normal in size.  Lungs are symmetrically expanded.  No evidence of focal consolidative process, pneumothorax, or significant effusion.  No acute osseous abnormality identified.                                CT Head Without Contrast (Final result)  Result time 03/28/19 01:28:57    Final result by Reena Costello MD (03/28/19 01:28:57)                 Impression:      1. No CT evidence of acute intracranial abnormality. Clinical correlation and further evaluation as warranted.  2. Generalized cerebral volume loss and findings suggestive of chronic microvascular ischemic change.      Electronically signed by: Reena Costello MD  Date:    03/28/2019  Time:    01:28             Narrative:    EXAMINATION:  CT HEAD WITHOUT CONTRAST    CLINICAL HISTORY:  altered mental status;    TECHNIQUE:  Low dose axial images were obtained through the head.  Coronal and sagittal reformations were also performed. Contrast was not administered.    COMPARISON:  Head CT 01/11/2019    FINDINGS:  There is no acute intracranial hemorrhage, hydrocephalus, midline shift or mass effect. There is generalized cerebral and cerebellar volume loss with compensatory prominence of cerebral sulci and the ventricular system.  There is supratentorial and periventricular white matter hypoattenuation which is nonspecific but likely reflect sequela of chronic microvascular ischemic change.  Probable remote right basal ganglia lacunar type infarcts.  The basal cisterns are patent. The mastoid air cells and paranasal sinuses are clear of acute process noting slight opacification of posterior ethmoid air cells.  The visualized bones of the calvarium demonstrate no acute osseous abnormality.                                 Medical Decision Making:   Initial Assessment:   89-year-old female with history of Parkinson's disease, hypertension, hyperlipidemia, blindness, and diabetes presenting with altered mental status.  Patient's daughter states she has been very confused all day, hallucinating, talking into thin air.  Reports the less times have been she had UTI.  On exam, patient well-appearing in no acute distress.  Neuro exam normal.  Blindness at baseline.   Patient denies pain of any sort.  She is oriented x3 at this time.  Differential includes UTI, stroke, Parkinson's dementia.  We will get labs, head CT, urine, and reassess.  ED Management:   well as strict return precautions.            Scribe Attestation:   Scribe #1: I performed the above scribed service and the documentation accurately describes the services I performed. I attest to the accuracy of the note.    Attending Attestation:           Physician Attestation for Scribe:  Physician Attestation Statement for Scribe #1: I, Jeff Meyer MD, reviewed documentation, as scribed by Paola Barba in my presence, and it is both accurate and complete.                    Clinical Impression:       ICD-10-CM ICD-9-CM   1. Altered mental status R41.82 780.97         Disposition:   Disposition: Discharged  Condition: Stable                        Jeff Meyer MD  03/28/19 0554

## 2019-03-28 NOTE — TELEPHONE ENCOUNTER
Dx codes do not work for medication, however the medication is only $9.      ----- Message from Christa Ndiaye sent at 3/28/2019  8:06 AM CDT -----  Contact: [facility]  Pharmacist calling regarding diginose code for med below.    QUEtiapine (SEROQUEL) 25 MG Tab      [id number] - [place], [place] - [address] [place] [address]  Phone: [phone] Fax: [phone]

## 2019-04-01 ENCOUNTER — TELEPHONE (OUTPATIENT)
Dept: FAMILY MEDICINE | Facility: CLINIC | Age: 84
End: 2019-04-01

## 2019-04-01 ENCOUNTER — OUTPATIENT CASE MANAGEMENT (OUTPATIENT)
Dept: ADMINISTRATIVE | Facility: OTHER | Age: 84
End: 2019-04-01

## 2019-04-01 NOTE — TELEPHONE ENCOUNTER
Please contact family member to reschedule appt. Nurse Practitioners can not admit to nursing homes. She will need an appt with PCP.

## 2019-04-01 NOTE — TELEPHONE ENCOUNTER
----- Message from Jaquelin Obregon sent at 4/1/2019 12:15 PM CDT -----  Contact: Rosmery pt's daughter  Name of Who is Calling:Rosmery        What is the request in detail: Pt's daughter requesting sooner appointment than 5.1.19. Pt is being admitted to nursing home and paperwork is needed for completion. Thanks-          Can the clinic reply by [facility]:N     What Number to Call Back if not in [facility]: [phone]

## 2019-04-01 NOTE — TELEPHONE ENCOUNTER
I believed I have hospital follow up that is sooner.  Okay to schedule in this spot.  Please schedule full 40 minutes.

## 2019-04-01 NOTE — PROGRESS NOTES
This LMSW received a referral on the above patient.   Reason for referral: Low risk, Assistance with nursing home placement  Name of the community resource that was provided: Medicaid Long Term Care Program  Resource given to: Patient's daughter via Telephone    LMSW completed assessment with patient's daughter, Rosmery. Patient is known to [facility] and previous assessment was completed on 12/04/19. Pt's daughter reports patient continues to live with her great grandson and needs assistance with ADLs. Daughter reports patient's great grandson supervises patient. She reports patient uses a walker to ambulate. Patient's daughter reports patient is completely blind and needs assistance walking around. Pt's daughter reports she is still seeking nursing home placement. Pt's daughter confirms she received resources sent by [facility] in December. She reports visiting different nursing homes since this time and reports she has found one potential option for placement. Pt's daughter reports she spoke to NH about penitentiary beds and they informed her that they had Medicaid penitentiary beds available. LMSW asked Pt's daughter if patient was approved for Medicaid LTC assistance with nursing home and she indicated that she has not received status of application. LMSW again encouraged Pt's daughter to contact Medicaid LTC Program to verify patient eligible for assistance. LMSW discussed with patient's daughter process for obtaining placement for patient and advised daughter nursing facilities will want to know how family will pay for NH and will want review patient's medical condition before they will accept patient for placement. Patient's daughter indicated that she had an application for a nursing facility and will proceed with completing form. Patient's daughter stated no further concerns. LMSW advised patient's daughter to contact this SW if she needed further assistance and she voiced understanding. Referral source notified. Case  closed.

## 2019-04-02 LAB
BACTERIA BLD CULT: NORMAL
BACTERIA BLD CULT: NORMAL

## 2019-04-09 ENCOUNTER — OFFICE VISIT (OUTPATIENT)
Dept: FAMILY MEDICINE | Facility: CLINIC | Age: 84
End: 2019-04-09
Payer: MEDICARE

## 2019-04-09 VITALS
SYSTOLIC BLOOD PRESSURE: 120 MMHG | TEMPERATURE: 99 F | DIASTOLIC BLOOD PRESSURE: 80 MMHG | HEART RATE: 72 BPM | OXYGEN SATURATION: 96 %

## 2019-04-09 DIAGNOSIS — E08.42 DIABETIC POLYNEUROPATHY ASSOCIATED WITH DIABETES MELLITUS DUE TO UNDERLYING CONDITION: ICD-10-CM

## 2019-04-09 DIAGNOSIS — E11.41 DIABETIC MONONEUROPATHY ASSOCIATED WITH TYPE 2 DIABETES MELLITUS: ICD-10-CM

## 2019-04-09 DIAGNOSIS — E11.9 TYPE 2 DIABETES MELLITUS WITHOUT COMPLICATION, WITHOUT LONG-TERM CURRENT USE OF INSULIN: ICD-10-CM

## 2019-04-09 DIAGNOSIS — I10 ESSENTIAL HYPERTENSION: ICD-10-CM

## 2019-04-09 DIAGNOSIS — M19.90 ARTHRITIS: ICD-10-CM

## 2019-04-09 DIAGNOSIS — I77.1 TORTUOUS AORTA: ICD-10-CM

## 2019-04-09 DIAGNOSIS — Z11.1 SCREENING-PULMONARY TB: ICD-10-CM

## 2019-04-09 DIAGNOSIS — Z02.2 ENCOUNTER FOR EXAMINATION FOR ADMISSION TO NURSING HOME: Primary | ICD-10-CM

## 2019-04-09 PROCEDURE — 1101F PR PT FALLS ASSESS DOC 0-1 FALLS W/OUT INJ PAST YR: ICD-10-PCS | Mod: HCNC,CPTII,S$GLB, | Performed by: FAMILY MEDICINE

## 2019-04-09 PROCEDURE — 99499 UNLISTED E&M SERVICE: CPT | Mod: S$GLB,,, | Performed by: FAMILY MEDICINE

## 2019-04-09 PROCEDURE — 99215 OFFICE O/P EST HI 40 MIN: CPT | Mod: HCNC,S$GLB,, | Performed by: FAMILY MEDICINE

## 2019-04-09 PROCEDURE — 99999 PR PBB SHADOW E&M-EST. PATIENT-LVL III: CPT | Mod: PBBFAC,HCNC,, | Performed by: FAMILY MEDICINE

## 2019-04-09 PROCEDURE — 1101F PT FALLS ASSESS-DOCD LE1/YR: CPT | Mod: HCNC,CPTII,S$GLB, | Performed by: FAMILY MEDICINE

## 2019-04-09 PROCEDURE — 86580 TB INTRADERMAL TEST: CPT | Mod: HCNC,S$GLB,, | Performed by: FAMILY MEDICINE

## 2019-04-09 PROCEDURE — 99215 PR OFFICE/OUTPT VISIT, EST, LEVL V, 40-54 MIN: ICD-10-PCS | Mod: HCNC,S$GLB,, | Performed by: FAMILY MEDICINE

## 2019-04-09 PROCEDURE — 86580 POCT TB SKIN TEST: ICD-10-PCS | Mod: HCNC,S$GLB,, | Performed by: FAMILY MEDICINE

## 2019-04-09 PROCEDURE — 99499 RISK ADDL DX/OHS AUDIT: ICD-10-PCS | Mod: S$GLB,,, | Performed by: FAMILY MEDICINE

## 2019-04-09 PROCEDURE — 99999 PR PBB SHADOW E&M-EST. PATIENT-LVL III: ICD-10-PCS | Mod: PBBFAC,HCNC,, | Performed by: FAMILY MEDICINE

## 2019-04-09 RX ORDER — CELECOXIB 200 MG/1
200 CAPSULE ORAL DAILY
Qty: 90 CAPSULE | Refills: 0 | Status: SHIPPED | OUTPATIENT
Start: 2019-04-09

## 2019-04-09 NOTE — PROGRESS NOTES
Assessment & Plan  Problem List Items Addressed This Visit        Neuro    Neuropathy in diabetes    Current Assessment & Plan     Patient is stable.  Assess and addressed all modifiable risk factors.  Continue with appropriate management to prevent complications.              Cardiac/Vascular    Essential hypertension    Current Assessment & Plan     Patient is stable.  Assess and addressed all modifiable risk factors.  Continue with appropriate management to prevent complications.           Tortuous aorta    Overview     as seen on portable xray from 11/22/15         Current Assessment & Plan     Patient is stable.  Assess and addressed all modifiable risk factors.  Continue with appropriate management to prevent complications.              Endocrine    Type 2 diabetes mellitus without complication, without long-term current use of insulin    Current Assessment & Plan     Controlled.  Pt is currently stable on medication regimen.  Continue current therapy as scheduled.  Contact office with any questions about adjustments on medications.               Orthopedic    Arthritis    Overview     osteoarthritis of the knees         Relevant Medications    celecoxib (CELEBREX) 200 MG capsule      Other Visit Diagnoses     Encounter for examination for admission to [facility]    -  Primary    Relevant Orders    POCT TB Skin Test Read    Screening-pulmonary TB        Relevant Orders    POCT TB Skin Test Read       This time it would be in the patient's best interest to be placed in a nursing home.  Patient can no longer be in her home unsupervised.  Patient may benefit from physical therapy and occupational therapy while she is in the nursing home.    Greater than 45 minutes was spent with this patient with greater than 50% spent with face-to-face counseling on above listed conditions.      Health Maintenance reviewed.    Follow-up: Follow up in about 6 months (around 10/9/2019) for Diabetes Mellitus  II.    ______________________________________________________________________    Chief Complaint  Chief Complaint   Patient presents with    forms filled       HPI  Yanira More is a 89 y.o. female with multiple medical diagnoses as listed in the medical history and problem list that presents for nursing home placement.  Pt is known to me with last appointment 9/26/2018.    Patient denies any new symptoms including chest pain, SOB, blurry vision, N/V, diarrhea.    She had increased confusion and hallucinations which was secondary to her medication.  She does have a history of parkinson's disease with dementia secondary to this diagnosis.  Her caregiver reports increased confusion, but hallucinations.  She has not hurt herself or others.  She may knock things over at home.  She did have a fall two months ago.  Dr. Damon has changed her medication.  She is currently on quetiapine for her dementia.  She has wandered around the house, but she has not wandered off outside.  She is not combative.  She needs assistance with all adls.      PAST MEDICAL HISTORY:  Past Medical History:   Diagnosis Date    Arthritis     osteoarthritis of the knees    Blind in both eyes     Chronic low back pain     with spinal stenosis at L4-L5    Degenerative disc disease     spinal stenosis at L4-L5    Depression     Diabetes mellitus type II     Essential tremor     Glaucoma     with legal blindness    History of shingles     was some mild postherpetic neuralgia    Hyperlipidemia     Hypertension        PAST SURGICAL HISTORY:  Past Surgical History:   Procedure Laterality Date    HEMIARTHROPLASTY Left 11/26/2016    [facility]    HYSTERECTOMY         SOCIAL HISTORY:  Social History     Socioeconomic History    Marital status:      Spouse name: Not on file    Number of children: Not on file    Years of education: Not on file    Highest education level: Not on file    Occupational History    Not on file   Social Needs    Financial resource strain: Not on file    Food insecurity:     Worry: Not on file     Inability: Not on file    Transportation needs:     Medical: Not on file     Non-medical: Not on file   Tobacco Use    Smoking status: Never Smoker    Smokeless tobacco: Never Used   Substance and Sexual Activity    Alcohol use: No    Drug use: No    Sexual activity: Never   Lifestyle    Physical activity:     Days per week: Not on file     Minutes per session: Not on file    Stress: Not on file   Relationships    Social connections:     Talks on phone: Not on file     Gets together: Not on file     Attends Anabaptist service: Not on file     Active member of club or organization: Not on file     Attends meetings of clubs or organizations: Not on file     Relationship status: Not on file   Other Topics Concern    Not on file   Social History Narrative    Lives with her daughter Rosmery Catalan who has power of        FAMILY HISTORY:  Family History   Problem Relation Age of Onset    Hypertension Sister     Diabetes Daughter     Early death Sister 50        reports natural causes       ALLERGIES AND MEDICATIONS: updated and reviewed.  Review of patient's allergies indicates:  No Known Allergies  Current Outpatient Medications   Medication Sig Dispense Refill    alendronate (FOSAMAX) 70 MG tablet Take 1 tablet (70 mg total) by mouth every 7 days. on Monday 12 tablet 1    amLODIPine (NORVASC) 5 MG tablet TAKE 1 TABLET BY MOUTH ONCE DAILY 90 tablet 0    benazepril (LOTENSIN) 10 MG tablet TAKE ONE TABLET BY MOUTH ONCE DAILY WITH  20MG  BENAZAPRIL 90 tablet 3    benazepril (LOTENSIN) 20 MG tablet Take 1 tablet (20 mg total) by mouth once daily. 90 tablet 3    betamethasone dipropionate (DIPROLENE) 0.05 % cream APPLY  CREAM TOPICALLY TO AFFECTED AREA TWICE DAILY 45 g 0    blood sugar diagnostic Strp 1 strip by Misc.(Non-Drug; Combo Route) route 2 (two)  times daily with meals. 100 strip 11    carbidopa-levodopa  mg (SINEMET)  mg per tablet Take 1 tablet by mouth 3 (three) times daily. 90 tablet 11    celecoxib (CELEBREX) 200 MG capsule Take 1 capsule (200 mg total) by mouth once daily. 90 capsule 0    diclofenac sodium (VOLTAREN) 1 % Gel APPLY 2G TOPICALLY ONCE DAILY to right shoulder 100 g 1    donepezil (ARICEPT) 5 MG tablet Take 1 tablet (5 mg total) by mouth every evening. 30 tablet 2    dorzolamide-timolol 2-0.5% (COSOPT) 22.3-6.8 mg/mL ophthalmic solution Place 1 drop into both eyes 2 (two) times daily. 10 mL 3    lancets Misc 1 lancet by Misc.(Non-Drug; Combo Route) route 2 (two) times daily with meals. 100 each 11    latanoprost 0.005 % ophthalmic solution       lovastatin (MEVACOR) 40 MG tablet TAKE 1 TABLET BY MOUTH ONCE DAILY IN THE EVENING 90 tablet 0    metFORMIN (GLUCOPHAGE-XR) 500 MG 24 hr tablet TAKE 1 TABLET BY MOUTH ONCE DAILY 90 tablet 0    QUEtiapine (SEROQUEL) 25 MG Tab Take half a tablet every night, if tolerating well after 1 week increase to 1 tablet every night 30 tablet 3    timolol maleate 0.25% (TIMOPTIC) 0.25 % Drop       blood-glucose meter kit Use as instructed 1 each 0    lancing device Misc 1 Device by Misc.(Non-Drug; Combo Route) route 2 (two) times daily with meals. 1 each 0    multivitamin capsule Take 1 capsule by mouth once daily.       No current facility-administered medications for this visit.          ROS  Review of Systems   Constitutional: Negative for activity change, appetite change, fatigue, fever and unexpected weight change.   HENT: Negative.  Negative for ear discharge, ear pain, rhinorrhea and sore throat.    Eyes: Negative.    Respiratory: Negative for apnea, cough, chest tightness, shortness of breath and wheezing.    Cardiovascular: Negative for chest pain, palpitations and leg swelling.   Gastrointestinal: Negative for abdominal distention, abdominal pain, constipation, diarrhea and  vomiting.   Endocrine: Negative for cold intolerance, heat intolerance, polydipsia and polyuria.   Genitourinary: Negative for decreased urine volume and urgency.   Musculoskeletal: Negative.    Skin: Negative for rash.   Neurological: Negative for dizziness and headaches.   Hematological: Does not bruise/bleed easily.   Psychiatric/Behavioral: Positive for confusion. Negative for agitation, hallucinations, sleep disturbance (only one episode of insomnia) and suicidal ideas.         Physical Exam  Vitals:    04/09/19 1323   BP: 120/80   BP Location: Right arm   Patient Position: Sitting   BP Method: Small (Manual)   Pulse: 72   Temp: 98.8 °F (37.1 °C)   TempSrc: Oral   SpO2: 96%    There is no height or weight on file to calculate BMI.          Physical Exam   Constitutional: She is oriented to person, place, and time. She appears well-developed and well-nourished.   HENT:   Head: Normocephalic and atraumatic.   Right Ear: External ear normal.   Left Ear: External ear normal.   Nose: Nose normal.   Mouth/Throat: Oropharynx is clear and moist.   Eyes: Pupils are equal, round, and reactive to light. Conjunctivae and EOM are normal.   Cardiovascular: Normal rate, regular rhythm and normal heart sounds.   Pulmonary/Chest: Effort normal and breath sounds normal.   Neurological: She is alert and oriented to person, place, and time.   Skin: Skin is warm and dry.   Vitals reviewed.        Health Maintenance       Date Due Completion Date    Zoster Vaccine 06/26/1985 ---    Hemoglobin A1c 09/28/2018 3/28/2018    Lipid Panel 03/28/2019 3/28/2018    Foot Exam 08/10/2019 8/10/2018 (Done)    [facility]10[facility]2018[facility]    Override on 1/14/2014: Done (Dr. Kiko Winston/Podiatry)    Eye Exam 11/05/2019 11/5/2018    TETANUS VACCINE 03/19/2025 3/19/2015              Patient note was created using [email or web address].  Any errors in syntax or even information may not have been identified and edited on initial review prior to  signing this note.

## 2019-04-10 DIAGNOSIS — I10 ESSENTIAL HYPERTENSION: ICD-10-CM

## 2019-04-10 RX ORDER — BENAZEPRIL HYDROCHLORIDE 20 MG/1
TABLET ORAL
Qty: 90 TABLET | Refills: 3 | Status: SHIPPED | OUTPATIENT
Start: 2019-04-10

## 2019-04-12 ENCOUNTER — CLINICAL SUPPORT (OUTPATIENT)
Dept: FAMILY MEDICINE | Facility: CLINIC | Age: 84
End: 2019-04-12
Payer: MEDICARE

## 2019-04-12 DIAGNOSIS — Z11.1 SCREENING-PULMONARY TB: Primary | ICD-10-CM

## 2019-04-12 PROCEDURE — 99499 UNLISTED E&M SERVICE: CPT | Mod: HCNC,S$GLB,, | Performed by: FAMILY MEDICINE

## 2019-04-12 PROCEDURE — 99499 NO LOS: ICD-10-PCS | Mod: HCNC,S$GLB,, | Performed by: FAMILY MEDICINE

## 2019-04-22 ENCOUNTER — TELEPHONE (OUTPATIENT)
Dept: FAMILY MEDICINE | Facility: CLINIC | Age: 84
End: 2019-04-22

## 2019-04-22 NOTE — TELEPHONE ENCOUNTER
Spoke with patient's daughter Rosmery.     She would like to know if forms for patient was sent to [facility]    Please advise

## 2019-04-22 NOTE — TELEPHONE ENCOUNTER
----- Message from Jason Grayson sent at 4/22/2019  2:53 PM CDT -----  Contact: Rosmery (Daughter)  [phone]  Type: Patient Call Back    Who called:Rosmery(Daughter)    What is the request in detail:Paperwork left for Doctor has not been received was supposed to be faxed to nursing home.     Can the clinic reply by [facility]?no    Would the patient rather a call back or a response via [facility]? Call back     Best call back number:[phone]

## 2019-04-29 ENCOUNTER — TELEPHONE (OUTPATIENT)
Dept: FAMILY MEDICINE | Facility: CLINIC | Age: 84
End: 2019-04-29

## 2019-04-29 NOTE — TELEPHONE ENCOUNTER
----- Message from Luann Steele sent at 4/29/2019 11:40 AM CDT -----  Contact: courtney Botello  [phone]  .Type: Patient Call Back    Who called: Courtney Botello     What is the request in detail: requesting the status on paper work that was suppose to be sent to the nursing home, They said that they never received it    Would the patient rather a call back or a response via [facility]?  Call back    Best call back number: [phone]    Thanks.......Caro

## 2019-04-30 DIAGNOSIS — I10 ESSENTIAL HYPERTENSION: ICD-10-CM

## 2019-04-30 RX ORDER — BENAZEPRIL HYDROCHLORIDE 10 MG/1
TABLET ORAL
Qty: 90 TABLET | Refills: 3 | Status: SHIPPED | OUTPATIENT
Start: 2019-04-30

## 2019-05-03 ENCOUNTER — TELEPHONE (OUTPATIENT)
Dept: FAMILY MEDICINE | Facility: CLINIC | Age: 84
End: 2019-05-03

## 2019-05-03 NOTE — TELEPHONE ENCOUNTER
----- Message from Christa Ndiaye sent at 5/3/2019 10:03 AM CDT -----  Contact: Rosmery Catalan (Daughter)  Type: Patient Call Back    Who called:Rosmery Alayna (Daughter)    What is the request in detail: Daughter calling regarding application for nursing home. Daughter states that nursing home said they need the first paper to be filled by dr.    Can the clinic reply by [facility]? No    Would the patient rather a call back or a response via [facility]? Call back    Best call back number: [phone]

## 2019-05-03 NOTE — TELEPHONE ENCOUNTER
Patient's daughter asked that I call the  of [facility] at [phone] to find out what's needed to complete the admission application for Patient.

## 2019-05-06 RX ORDER — LOVASTATIN 40 MG/1
TABLET ORAL
Qty: 90 TABLET | Refills: 0 | Status: SHIPPED | OUTPATIENT
Start: 2019-05-06

## 2019-05-09 ENCOUNTER — TELEPHONE (OUTPATIENT)
Dept: FAMILY MEDICINE | Facility: CLINIC | Age: 84
End: 2019-05-09

## 2019-05-09 NOTE — TELEPHONE ENCOUNTER
----- Message from Christa Ndiaye sent at 5/9/2019 10:25 AM CDT -----  Contact: Rosmery- daughter   Pt daughter calling to check status on paper work for pt. [phone]

## 2019-05-09 NOTE — TELEPHONE ENCOUNTER
Spoke with daughter and nelson at [facility] to send needed additional paperwork and it will be returned when completed

## 2019-05-10 ENCOUNTER — TELEPHONE (OUTPATIENT)
Dept: FAMILY MEDICINE | Facility: CLINIC | Age: 84
End: 2019-05-10

## 2019-05-10 NOTE — TELEPHONE ENCOUNTER
Called Patient's daughter.  No answer.  Left voice message informing her of the status of Patient's paperwork for [facility].

## 2019-05-10 NOTE — TELEPHONE ENCOUNTER
[facility] at [facility] said to inform Dr. Sanchez that the facility has its own doctor who will care for Patient and that she just needs to sign page 1, section 1 of the Pre-Admission Screening and Resident Review in [place] to the Statement of Medical Status prior to her admission.  Please advise.

## 2019-05-17 ENCOUNTER — TELEPHONE (OUTPATIENT)
Dept: FAMILY MEDICINE | Facility: CLINIC | Age: 84
End: 2019-05-17

## 2019-05-17 NOTE — TELEPHONE ENCOUNTER
----- Message from Chelita Braswell sent at 5/17/2019  2:56 PM CDT -----  Contact: Self   Type: Patient Call Back    Who called:Darren    What is the request in detail: Patient is asking to speak with the office in ref to the nursing home missing something. She would like a call back     Can the clinic reply by [facility]?  Call  Would the patient rather a call back or a response via [facility]? Call     Best call back number: [phone]

## 2019-05-17 NOTE — TELEPHONE ENCOUNTER
----- Message from Ji Shultz sent at 5/17/2019  1:26 PM CDT -----  Contact: George Orozco/[phone]  Type: Patient Call Back    Who called:George Orozco    What is the request in detail:George would like the staff to send over the patients PPD, and a updated chest Xray.    Would the patient rather a call back or a response via [facility]? Call back    Best call back number:[phone]      Thank you

## 2019-05-20 ENCOUNTER — CLINICAL SUPPORT (OUTPATIENT)
Dept: FAMILY MEDICINE | Facility: CLINIC | Age: 84
End: 2019-05-20
Payer: MEDICARE

## 2019-05-20 ENCOUNTER — TELEPHONE (OUTPATIENT)
Dept: FAMILY MEDICINE | Facility: CLINIC | Age: 84
End: 2019-05-20

## 2019-05-20 DIAGNOSIS — Z11.1 SCREENING-PULMONARY TB: Primary | ICD-10-CM

## 2019-05-20 PROCEDURE — 99499 UNLISTED E&M SERVICE: CPT | Mod: HCNC,S$GLB,, | Performed by: FAMILY MEDICINE

## 2019-05-20 PROCEDURE — 86580 TB INTRADERMAL TEST: CPT | Mod: HCNC,S$GLB,, | Performed by: FAMILY MEDICINE

## 2019-05-20 PROCEDURE — 99499 NO LOS: ICD-10-PCS | Mod: HCNC,S$GLB,, | Performed by: FAMILY MEDICINE

## 2019-05-20 PROCEDURE — 86580 POCT TB SKIN TEST: ICD-10-PCS | Mod: HCNC,S$GLB,, | Performed by: FAMILY MEDICINE

## 2019-05-20 NOTE — TELEPHONE ENCOUNTER
----- Message from Zenon Alberto sent at 5/20/2019  8:41 AM CDT -----  Contact: Rosmery (daughter)  Name of Who is Calling: Rosmery (daughter)       What is the request in detail: Would like speak with staff in regards to getting the patient a TB skin test for admittance into the nursing home. States she will need a form for the nursing home to read the TB test. Patient suppose to be admitted today. Please advise      Can the clinic reply by [facility]: no      What Number to Call Back if not in [facility]: [phone]

## 2019-05-20 NOTE — TELEPHONE ENCOUNTER
Patient had TB test 4/9/19, nursing home will not except, requesting another TB test, need one < 30 days old. Please advise

## 2019-05-22 ENCOUNTER — CLINICAL SUPPORT (OUTPATIENT)
Dept: FAMILY MEDICINE | Facility: CLINIC | Age: 84
End: 2019-05-22
Payer: MEDICARE

## 2019-05-22 DIAGNOSIS — Z11.1 SCREENING-PULMONARY TB: Primary | ICD-10-CM

## 2019-05-22 PROCEDURE — 99499 UNLISTED E&M SERVICE: CPT | Mod: HCNC,S$GLB,, | Performed by: FAMILY MEDICINE

## 2019-05-22 PROCEDURE — 99499 NO LOS: ICD-10-PCS | Mod: HCNC,S$GLB,, | Performed by: FAMILY MEDICINE

## 2019-11-22 NOTE — ASSESSMENT & PLAN NOTE
Stable and controlled. Continue current treatment plan as previously prescribed with your PCP.   The patient is asked to make an attempt to improve diet and exercise patterns to aid in medical management of this problem.      
//

## 2020-04-03 DIAGNOSIS — E11.9 TYPE 2 DIABETES MELLITUS WITHOUT COMPLICATION, WITHOUT LONG-TERM CURRENT USE OF INSULIN: ICD-10-CM

## 2020-06-29 ENCOUNTER — LAB VISIT (OUTPATIENT)
Dept: LAB | Facility: OTHER | Age: 85
End: 2020-06-29
Payer: MEDICARE

## 2020-06-29 DIAGNOSIS — Z20.822 SUSPECTED COVID-19 VIRUS INFECTION: ICD-10-CM

## 2020-06-29 PROCEDURE — U0003 INFECTIOUS AGENT DETECTION BY NUCLEIC ACID (DNA OR RNA); SEVERE ACUTE RESPIRATORY SYNDROME CORONAVIRUS 2 (SARS-COV-2) (CORONAVIRUS DISEASE [COVID-19]), AMPLIFIED PROBE TECHNIQUE, MAKING USE OF HIGH THROUGHPUT TECHNOLOGIES AS DESCRIBED BY CMS-2020-01-R: HCPCS

## 2020-07-02 LAB — SARS-COV-2 RNA RESP QL NAA+PROBE: NEGATIVE

## 2020-07-06 ENCOUNTER — LAB VISIT (OUTPATIENT)
Dept: LAB | Facility: OTHER | Age: 85
End: 2020-07-06
Payer: MEDICARE

## 2020-07-06 DIAGNOSIS — Z20.822 SUSPECTED COVID-19 VIRUS INFECTION: ICD-10-CM

## 2020-07-06 PROCEDURE — U0003 INFECTIOUS AGENT DETECTION BY NUCLEIC ACID (DNA OR RNA); SEVERE ACUTE RESPIRATORY SYNDROME CORONAVIRUS 2 (SARS-COV-2) (CORONAVIRUS DISEASE [COVID-19]), AMPLIFIED PROBE TECHNIQUE, MAKING USE OF HIGH THROUGHPUT TECHNOLOGIES AS DESCRIBED BY CMS-2020-01-R: HCPCS

## 2020-07-09 LAB — SARS-COV-2 RNA RESP QL NAA+PROBE: NEGATIVE

## 2020-07-13 ENCOUNTER — LAB VISIT (OUTPATIENT)
Dept: LAB | Facility: OTHER | Age: 85
End: 2020-07-13
Payer: MEDICARE

## 2020-07-13 DIAGNOSIS — Z20.822 SUSPECTED COVID-19 VIRUS INFECTION: ICD-10-CM

## 2020-07-13 PROCEDURE — U0003 INFECTIOUS AGENT DETECTION BY NUCLEIC ACID (DNA OR RNA); SEVERE ACUTE RESPIRATORY SYNDROME CORONAVIRUS 2 (SARS-COV-2) (CORONAVIRUS DISEASE [COVID-19]), AMPLIFIED PROBE TECHNIQUE, MAKING USE OF HIGH THROUGHPUT TECHNOLOGIES AS DESCRIBED BY CMS-2020-01-R: HCPCS

## 2020-07-16 LAB — SARS-COV-2 RNA RESP QL NAA+PROBE: NEGATIVE

## 2020-07-20 ENCOUNTER — LAB VISIT (OUTPATIENT)
Dept: LAB | Facility: OTHER | Age: 85
End: 2020-07-20
Payer: MEDICARE

## 2020-07-20 DIAGNOSIS — Z03.818 ENCOUNTER FOR OBSERVATION FOR SUSPECTED EXPOSURE TO OTHER BIOLOGICAL AGENTS RULED OUT: ICD-10-CM

## 2020-07-20 DIAGNOSIS — Z20.822 SUSPECTED COVID-19 VIRUS INFECTION: ICD-10-CM

## 2020-07-20 PROCEDURE — U0003 INFECTIOUS AGENT DETECTION BY NUCLEIC ACID (DNA OR RNA); SEVERE ACUTE RESPIRATORY SYNDROME CORONAVIRUS 2 (SARS-COV-2) (CORONAVIRUS DISEASE [COVID-19]), AMPLIFIED PROBE TECHNIQUE, MAKING USE OF HIGH THROUGHPUT TECHNOLOGIES AS DESCRIBED BY CMS-2020-01-R: HCPCS

## 2020-07-23 LAB — SARS-COV-2 RNA RESP QL NAA+PROBE: NEGATIVE

## 2020-07-27 ENCOUNTER — LAB VISIT (OUTPATIENT)
Dept: LAB | Facility: OTHER | Age: 85
End: 2020-07-27
Payer: MEDICARE

## 2020-07-27 DIAGNOSIS — Z03.818 ENCOUNTER FOR OBSERVATION FOR SUSPECTED EXPOSURE TO OTHER BIOLOGICAL AGENTS RULED OUT: ICD-10-CM

## 2020-07-27 DIAGNOSIS — Z20.822 SUSPECTED COVID-19 VIRUS INFECTION: ICD-10-CM

## 2020-07-27 PROCEDURE — U0003 INFECTIOUS AGENT DETECTION BY NUCLEIC ACID (DNA OR RNA); SEVERE ACUTE RESPIRATORY SYNDROME CORONAVIRUS 2 (SARS-COV-2) (CORONAVIRUS DISEASE [COVID-19]), AMPLIFIED PROBE TECHNIQUE, MAKING USE OF HIGH THROUGHPUT TECHNOLOGIES AS DESCRIBED BY CMS-2020-01-R: HCPCS

## 2020-07-29 LAB — SARS-COV-2 RNA RESP QL NAA+PROBE: NEGATIVE

## 2020-08-03 ENCOUNTER — LAB VISIT (OUTPATIENT)
Dept: LAB | Facility: OTHER | Age: 85
End: 2020-08-03
Payer: MEDICARE

## 2020-08-03 DIAGNOSIS — Z20.822 SUSPECTED COVID-19 VIRUS INFECTION: ICD-10-CM

## 2020-08-03 DIAGNOSIS — Z03.818 ENCOUNTER FOR OBSERVATION FOR SUSPECTED EXPOSURE TO OTHER BIOLOGICAL AGENTS RULED OUT: ICD-10-CM

## 2020-08-03 PROCEDURE — U0003 INFECTIOUS AGENT DETECTION BY NUCLEIC ACID (DNA OR RNA); SEVERE ACUTE RESPIRATORY SYNDROME CORONAVIRUS 2 (SARS-COV-2) (CORONAVIRUS DISEASE [COVID-19]), AMPLIFIED PROBE TECHNIQUE, MAKING USE OF HIGH THROUGHPUT TECHNOLOGIES AS DESCRIBED BY CMS-2020-01-R: HCPCS

## 2020-08-04 LAB — SARS-COV-2 RNA RESP QL NAA+PROBE: NOT DETECTED

## 2020-08-10 ENCOUNTER — LAB VISIT (OUTPATIENT)
Dept: LAB | Facility: OTHER | Age: 85
End: 2020-08-10
Payer: MEDICARE

## 2020-08-10 DIAGNOSIS — Z03.818 ENCOUNTER FOR OBSERVATION FOR SUSPECTED EXPOSURE TO OTHER BIOLOGICAL AGENTS RULED OUT: ICD-10-CM

## 2020-08-10 PROCEDURE — U0003 INFECTIOUS AGENT DETECTION BY NUCLEIC ACID (DNA OR RNA); SEVERE ACUTE RESPIRATORY SYNDROME CORONAVIRUS 2 (SARS-COV-2) (CORONAVIRUS DISEASE [COVID-19]), AMPLIFIED PROBE TECHNIQUE, MAKING USE OF HIGH THROUGHPUT TECHNOLOGIES AS DESCRIBED BY CMS-2020-01-R: HCPCS

## 2020-08-11 LAB — SARS-COV-2 RNA RESP QL NAA+PROBE: NOT DETECTED

## 2020-08-17 ENCOUNTER — LAB VISIT (OUTPATIENT)
Dept: LAB | Facility: OTHER | Age: 85
End: 2020-08-17
Payer: MEDICARE

## 2020-08-17 DIAGNOSIS — Z03.818 ENCOUNTER FOR OBSERVATION FOR SUSPECTED EXPOSURE TO OTHER BIOLOGICAL AGENTS RULED OUT: ICD-10-CM

## 2020-08-17 PROCEDURE — U0003 INFECTIOUS AGENT DETECTION BY NUCLEIC ACID (DNA OR RNA); SEVERE ACUTE RESPIRATORY SYNDROME CORONAVIRUS 2 (SARS-COV-2) (CORONAVIRUS DISEASE [COVID-19]), AMPLIFIED PROBE TECHNIQUE, MAKING USE OF HIGH THROUGHPUT TECHNOLOGIES AS DESCRIBED BY CMS-2020-01-R: HCPCS

## 2020-08-19 LAB — SARS-COV-2 RNA RESP QL NAA+PROBE: NOT DETECTED

## 2020-08-24 ENCOUNTER — LAB VISIT (OUTPATIENT)
Dept: LAB | Facility: OTHER | Age: 85
End: 2020-08-24
Payer: MEDICARE

## 2020-08-24 DIAGNOSIS — Z03.818 ENCOUNTER FOR OBSERVATION FOR SUSPECTED EXPOSURE TO OTHER BIOLOGICAL AGENTS RULED OUT: ICD-10-CM

## 2020-08-24 PROCEDURE — U0003 INFECTIOUS AGENT DETECTION BY NUCLEIC ACID (DNA OR RNA); SEVERE ACUTE RESPIRATORY SYNDROME CORONAVIRUS 2 (SARS-COV-2) (CORONAVIRUS DISEASE [COVID-19]), AMPLIFIED PROBE TECHNIQUE, MAKING USE OF HIGH THROUGHPUT TECHNOLOGIES AS DESCRIBED BY CMS-2020-01-R: HCPCS

## 2020-08-25 LAB — SARS-COV-2 RNA RESP QL NAA+PROBE: NOT DETECTED

## 2020-08-31 ENCOUNTER — LAB VISIT (OUTPATIENT)
Dept: LAB | Facility: OTHER | Age: 85
End: 2020-08-31
Payer: MEDICARE

## 2020-08-31 DIAGNOSIS — Z03.818 ENCOUNTER FOR OBSERVATION FOR SUSPECTED EXPOSURE TO OTHER BIOLOGICAL AGENTS RULED OUT: ICD-10-CM

## 2020-08-31 PROCEDURE — U0003 INFECTIOUS AGENT DETECTION BY NUCLEIC ACID (DNA OR RNA); SEVERE ACUTE RESPIRATORY SYNDROME CORONAVIRUS 2 (SARS-COV-2) (CORONAVIRUS DISEASE [COVID-19]), AMPLIFIED PROBE TECHNIQUE, MAKING USE OF HIGH THROUGHPUT TECHNOLOGIES AS DESCRIBED BY CMS-2020-01-R: HCPCS

## 2020-09-01 LAB — SARS-COV-2 RNA RESP QL NAA+PROBE: NOT DETECTED

## 2020-09-07 ENCOUNTER — LAB VISIT (OUTPATIENT)
Dept: LAB | Facility: OTHER | Age: 85
End: 2020-09-07
Payer: MEDICARE

## 2020-09-07 DIAGNOSIS — Z03.818 ENCOUNTER FOR OBSERVATION FOR SUSPECTED EXPOSURE TO OTHER BIOLOGICAL AGENTS RULED OUT: ICD-10-CM

## 2020-09-07 PROCEDURE — U0003 INFECTIOUS AGENT DETECTION BY NUCLEIC ACID (DNA OR RNA); SEVERE ACUTE RESPIRATORY SYNDROME CORONAVIRUS 2 (SARS-COV-2) (CORONAVIRUS DISEASE [COVID-19]), AMPLIFIED PROBE TECHNIQUE, MAKING USE OF HIGH THROUGHPUT TECHNOLOGIES AS DESCRIBED BY CMS-2020-01-R: HCPCS

## 2020-09-08 LAB — SARS-COV-2 RNA RESP QL NAA+PROBE: NOT DETECTED

## 2020-09-14 ENCOUNTER — LAB VISIT (OUTPATIENT)
Dept: LAB | Facility: OTHER | Age: 85
End: 2020-09-14
Attending: INTERNAL MEDICINE
Payer: MEDICARE

## 2020-09-14 DIAGNOSIS — Z03.818 ENCOUNTER FOR OBSERVATION FOR SUSPECTED EXPOSURE TO OTHER BIOLOGICAL AGENTS RULED OUT: ICD-10-CM

## 2020-09-14 PROCEDURE — U0003 INFECTIOUS AGENT DETECTION BY NUCLEIC ACID (DNA OR RNA); SEVERE ACUTE RESPIRATORY SYNDROME CORONAVIRUS 2 (SARS-COV-2) (CORONAVIRUS DISEASE [COVID-19]), AMPLIFIED PROBE TECHNIQUE, MAKING USE OF HIGH THROUGHPUT TECHNOLOGIES AS DESCRIBED BY CMS-2020-01-R: HCPCS

## 2020-09-15 LAB — SARS-COV-2 RNA RESP QL NAA+PROBE: NOT DETECTED

## 2020-09-21 ENCOUNTER — LAB VISIT (OUTPATIENT)
Dept: LAB | Facility: OTHER | Age: 85
End: 2020-09-21
Payer: MEDICARE

## 2020-09-21 DIAGNOSIS — Z03.818 ENCOUNTER FOR OBSERVATION FOR SUSPECTED EXPOSURE TO OTHER BIOLOGICAL AGENTS RULED OUT: ICD-10-CM

## 2020-09-21 LAB — SARS-COV-2 RNA RESP QL NAA+PROBE: NOT DETECTED

## 2020-09-21 PROCEDURE — U0003 INFECTIOUS AGENT DETECTION BY NUCLEIC ACID (DNA OR RNA); SEVERE ACUTE RESPIRATORY SYNDROME CORONAVIRUS 2 (SARS-COV-2) (CORONAVIRUS DISEASE [COVID-19]), AMPLIFIED PROBE TECHNIQUE, MAKING USE OF HIGH THROUGHPUT TECHNOLOGIES AS DESCRIBED BY CMS-2020-01-R: HCPCS

## 2020-09-28 ENCOUNTER — LAB VISIT (OUTPATIENT)
Dept: LAB | Facility: OTHER | Age: 85
End: 2020-09-28
Payer: MEDICARE

## 2020-09-28 DIAGNOSIS — Z03.818 ENCOUNTER FOR OBSERVATION FOR SUSPECTED EXPOSURE TO OTHER BIOLOGICAL AGENTS RULED OUT: ICD-10-CM

## 2020-09-28 PROCEDURE — U0003 INFECTIOUS AGENT DETECTION BY NUCLEIC ACID (DNA OR RNA); SEVERE ACUTE RESPIRATORY SYNDROME CORONAVIRUS 2 (SARS-COV-2) (CORONAVIRUS DISEASE [COVID-19]), AMPLIFIED PROBE TECHNIQUE, MAKING USE OF HIGH THROUGHPUT TECHNOLOGIES AS DESCRIBED BY CMS-2020-01-R: HCPCS

## 2020-09-29 LAB — SARS-COV-2 RNA RESP QL NAA+PROBE: NOT DETECTED

## 2020-10-01 ENCOUNTER — PATIENT MESSAGE (OUTPATIENT)
Dept: OTHER | Facility: OTHER | Age: 85
End: 2020-10-01

## 2020-10-05 ENCOUNTER — LAB VISIT (OUTPATIENT)
Dept: LAB | Facility: OTHER | Age: 85
End: 2020-10-05
Payer: MEDICARE

## 2020-10-05 DIAGNOSIS — Z03.818 ENCOUNTER FOR OBSERVATION FOR SUSPECTED EXPOSURE TO OTHER BIOLOGICAL AGENTS RULED OUT: ICD-10-CM

## 2020-10-05 LAB — SARS-COV-2 RNA RESP QL NAA+PROBE: NOT DETECTED

## 2020-10-05 PROCEDURE — U0003 INFECTIOUS AGENT DETECTION BY NUCLEIC ACID (DNA OR RNA); SEVERE ACUTE RESPIRATORY SYNDROME CORONAVIRUS 2 (SARS-COV-2) (CORONAVIRUS DISEASE [COVID-19]), AMPLIFIED PROBE TECHNIQUE, MAKING USE OF HIGH THROUGHPUT TECHNOLOGIES AS DESCRIBED BY CMS-2020-01-R: HCPCS

## 2020-10-12 ENCOUNTER — LAB VISIT (OUTPATIENT)
Dept: LAB | Facility: OTHER | Age: 85
End: 2020-10-12
Payer: MEDICARE

## 2020-10-12 DIAGNOSIS — Z03.818 ENCOUNTER FOR OBSERVATION FOR SUSPECTED EXPOSURE TO OTHER BIOLOGICAL AGENTS RULED OUT: ICD-10-CM

## 2020-10-12 PROCEDURE — U0003 INFECTIOUS AGENT DETECTION BY NUCLEIC ACID (DNA OR RNA); SEVERE ACUTE RESPIRATORY SYNDROME CORONAVIRUS 2 (SARS-COV-2) (CORONAVIRUS DISEASE [COVID-19]), AMPLIFIED PROBE TECHNIQUE, MAKING USE OF HIGH THROUGHPUT TECHNOLOGIES AS DESCRIBED BY CMS-2020-01-R: HCPCS

## 2020-10-13 LAB — SARS-COV-2 RNA RESP QL NAA+PROBE: NOT DETECTED

## 2020-10-19 ENCOUNTER — LAB VISIT (OUTPATIENT)
Dept: LAB | Facility: OTHER | Age: 85
End: 2020-10-19
Payer: MEDICARE

## 2020-10-19 DIAGNOSIS — Z03.818 ENCOUNTER FOR OBSERVATION FOR SUSPECTED EXPOSURE TO OTHER BIOLOGICAL AGENTS RULED OUT: ICD-10-CM

## 2020-10-19 PROCEDURE — U0003 INFECTIOUS AGENT DETECTION BY NUCLEIC ACID (DNA OR RNA); SEVERE ACUTE RESPIRATORY SYNDROME CORONAVIRUS 2 (SARS-COV-2) (CORONAVIRUS DISEASE [COVID-19]), AMPLIFIED PROBE TECHNIQUE, MAKING USE OF HIGH THROUGHPUT TECHNOLOGIES AS DESCRIBED BY CMS-2020-01-R: HCPCS

## 2020-10-20 LAB — SARS-COV-2 RNA RESP QL NAA+PROBE: NOT DETECTED

## 2020-10-26 ENCOUNTER — LAB VISIT (OUTPATIENT)
Dept: LAB | Facility: OTHER | Age: 85
End: 2020-10-26
Payer: MEDICARE

## 2020-10-26 DIAGNOSIS — Z03.818 ENCOUNTER FOR OBSERVATION FOR SUSPECTED EXPOSURE TO OTHER BIOLOGICAL AGENTS RULED OUT: ICD-10-CM

## 2020-10-26 LAB — SARS-COV-2 RNA RESP QL NAA+PROBE: NOT DETECTED

## 2020-10-26 PROCEDURE — U0003 INFECTIOUS AGENT DETECTION BY NUCLEIC ACID (DNA OR RNA); SEVERE ACUTE RESPIRATORY SYNDROME CORONAVIRUS 2 (SARS-COV-2) (CORONAVIRUS DISEASE [COVID-19]), AMPLIFIED PROBE TECHNIQUE, MAKING USE OF HIGH THROUGHPUT TECHNOLOGIES AS DESCRIBED BY CMS-2020-01-R: HCPCS

## 2020-11-02 ENCOUNTER — LAB VISIT (OUTPATIENT)
Dept: LAB | Facility: OTHER | Age: 85
End: 2020-11-02
Payer: MEDICARE

## 2020-11-02 DIAGNOSIS — Z03.818 ENCOUNTER FOR OBSERVATION FOR SUSPECTED EXPOSURE TO OTHER BIOLOGICAL AGENTS RULED OUT: ICD-10-CM

## 2020-11-02 PROCEDURE — U0003 INFECTIOUS AGENT DETECTION BY NUCLEIC ACID (DNA OR RNA); SEVERE ACUTE RESPIRATORY SYNDROME CORONAVIRUS 2 (SARS-COV-2) (CORONAVIRUS DISEASE [COVID-19]), AMPLIFIED PROBE TECHNIQUE, MAKING USE OF HIGH THROUGHPUT TECHNOLOGIES AS DESCRIBED BY CMS-2020-01-R: HCPCS

## 2020-11-04 LAB — SARS-COV-2 RNA RESP QL NAA+PROBE: NOT DETECTED

## 2020-11-09 ENCOUNTER — LAB VISIT (OUTPATIENT)
Dept: LAB | Facility: OTHER | Age: 85
End: 2020-11-09
Payer: MEDICARE

## 2020-11-09 DIAGNOSIS — Z03.818 ENCOUNTER FOR OBSERVATION FOR SUSPECTED EXPOSURE TO OTHER BIOLOGICAL AGENTS RULED OUT: ICD-10-CM

## 2020-11-09 LAB — SARS-COV-2 RNA RESP QL NAA+PROBE: NOT DETECTED

## 2020-11-09 PROCEDURE — U0003 INFECTIOUS AGENT DETECTION BY NUCLEIC ACID (DNA OR RNA); SEVERE ACUTE RESPIRATORY SYNDROME CORONAVIRUS 2 (SARS-COV-2) (CORONAVIRUS DISEASE [COVID-19]), AMPLIFIED PROBE TECHNIQUE, MAKING USE OF HIGH THROUGHPUT TECHNOLOGIES AS DESCRIBED BY CMS-2020-01-R: HCPCS

## 2020-11-16 ENCOUNTER — LAB VISIT (OUTPATIENT)
Dept: LAB | Facility: OTHER | Age: 85
End: 2020-11-16
Payer: MEDICARE

## 2020-11-16 DIAGNOSIS — Z03.818 ENCOUNTER FOR OBSERVATION FOR SUSPECTED EXPOSURE TO OTHER BIOLOGICAL AGENTS RULED OUT: ICD-10-CM

## 2020-11-16 PROCEDURE — U0003 INFECTIOUS AGENT DETECTION BY NUCLEIC ACID (DNA OR RNA); SEVERE ACUTE RESPIRATORY SYNDROME CORONAVIRUS 2 (SARS-COV-2) (CORONAVIRUS DISEASE [COVID-19]), AMPLIFIED PROBE TECHNIQUE, MAKING USE OF HIGH THROUGHPUT TECHNOLOGIES AS DESCRIBED BY CMS-2020-01-R: HCPCS

## 2020-11-18 LAB — SARS-COV-2 RNA RESP QL NAA+PROBE: NOT DETECTED

## 2020-11-23 ENCOUNTER — LAB VISIT (OUTPATIENT)
Dept: LAB | Facility: OTHER | Age: 85
End: 2020-11-23
Payer: MEDICARE

## 2020-11-23 DIAGNOSIS — Z03.818 ENCOUNTER FOR OBSERVATION FOR SUSPECTED EXPOSURE TO OTHER BIOLOGICAL AGENTS RULED OUT: ICD-10-CM

## 2020-11-23 PROCEDURE — U0003 INFECTIOUS AGENT DETECTION BY NUCLEIC ACID (DNA OR RNA); SEVERE ACUTE RESPIRATORY SYNDROME CORONAVIRUS 2 (SARS-COV-2) (CORONAVIRUS DISEASE [COVID-19]), AMPLIFIED PROBE TECHNIQUE, MAKING USE OF HIGH THROUGHPUT TECHNOLOGIES AS DESCRIBED BY CMS-2020-01-R: HCPCS

## 2020-11-24 LAB — SARS-COV-2 RNA RESP QL NAA+PROBE: NOT DETECTED

## 2020-11-30 ENCOUNTER — LAB VISIT (OUTPATIENT)
Dept: LAB | Facility: OTHER | Age: 85
End: 2020-11-30
Payer: MEDICARE

## 2020-11-30 DIAGNOSIS — Z03.818 ENCOUNTER FOR OBSERVATION FOR SUSPECTED EXPOSURE TO OTHER BIOLOGICAL AGENTS RULED OUT: ICD-10-CM

## 2020-11-30 PROCEDURE — U0003 INFECTIOUS AGENT DETECTION BY NUCLEIC ACID (DNA OR RNA); SEVERE ACUTE RESPIRATORY SYNDROME CORONAVIRUS 2 (SARS-COV-2) (CORONAVIRUS DISEASE [COVID-19]), AMPLIFIED PROBE TECHNIQUE, MAKING USE OF HIGH THROUGHPUT TECHNOLOGIES AS DESCRIBED BY CMS-2020-01-R: HCPCS

## 2020-12-01 LAB — SARS-COV-2 RNA RESP QL NAA+PROBE: NOT DETECTED

## 2020-12-07 ENCOUNTER — LAB VISIT (OUTPATIENT)
Dept: LAB | Facility: OTHER | Age: 85
End: 2020-12-07
Payer: MEDICARE

## 2020-12-07 DIAGNOSIS — Z03.818 ENCOUNTER FOR OBSERVATION FOR SUSPECTED EXPOSURE TO OTHER BIOLOGICAL AGENTS RULED OUT: ICD-10-CM

## 2020-12-07 PROCEDURE — U0003 INFECTIOUS AGENT DETECTION BY NUCLEIC ACID (DNA OR RNA); SEVERE ACUTE RESPIRATORY SYNDROME CORONAVIRUS 2 (SARS-COV-2) (CORONAVIRUS DISEASE [COVID-19]), AMPLIFIED PROBE TECHNIQUE, MAKING USE OF HIGH THROUGHPUT TECHNOLOGIES AS DESCRIBED BY CMS-2020-01-R: HCPCS

## 2020-12-09 LAB — SARS-COV-2 RNA RESP QL NAA+PROBE: NOT DETECTED

## 2020-12-11 ENCOUNTER — PATIENT MESSAGE (OUTPATIENT)
Dept: OTHER | Facility: OTHER | Age: 85
End: 2020-12-11

## 2020-12-14 ENCOUNTER — LAB VISIT (OUTPATIENT)
Dept: LAB | Facility: OTHER | Age: 85
End: 2020-12-14
Payer: MEDICARE

## 2020-12-14 DIAGNOSIS — Z03.818 ENCOUNTER FOR OBSERVATION FOR SUSPECTED EXPOSURE TO OTHER BIOLOGICAL AGENTS RULED OUT: ICD-10-CM

## 2020-12-14 PROCEDURE — U0003 INFECTIOUS AGENT DETECTION BY NUCLEIC ACID (DNA OR RNA); SEVERE ACUTE RESPIRATORY SYNDROME CORONAVIRUS 2 (SARS-COV-2) (CORONAVIRUS DISEASE [COVID-19]), AMPLIFIED PROBE TECHNIQUE, MAKING USE OF HIGH THROUGHPUT TECHNOLOGIES AS DESCRIBED BY CMS-2020-01-R: HCPCS

## 2020-12-16 LAB — SARS-COV-2 RNA RESP QL NAA+PROBE: NOT DETECTED

## 2020-12-17 ENCOUNTER — LAB VISIT (OUTPATIENT)
Dept: LAB | Facility: OTHER | Age: 85
End: 2020-12-17
Payer: MEDICARE

## 2020-12-17 DIAGNOSIS — Z03.818 ENCOUNTER FOR OBSERVATION FOR SUSPECTED EXPOSURE TO OTHER BIOLOGICAL AGENTS RULED OUT: ICD-10-CM

## 2020-12-17 PROCEDURE — U0003 INFECTIOUS AGENT DETECTION BY NUCLEIC ACID (DNA OR RNA); SEVERE ACUTE RESPIRATORY SYNDROME CORONAVIRUS 2 (SARS-COV-2) (CORONAVIRUS DISEASE [COVID-19]), AMPLIFIED PROBE TECHNIQUE, MAKING USE OF HIGH THROUGHPUT TECHNOLOGIES AS DESCRIBED BY CMS-2020-01-R: HCPCS

## 2020-12-18 LAB — SARS-COV-2 RNA RESP QL NAA+PROBE: NOT DETECTED

## 2020-12-22 ENCOUNTER — LAB VISIT (OUTPATIENT)
Dept: LAB | Facility: OTHER | Age: 85
End: 2020-12-22
Payer: MEDICARE

## 2020-12-22 DIAGNOSIS — Z03.818 ENCOUNTER FOR OBSERVATION FOR SUSPECTED EXPOSURE TO OTHER BIOLOGICAL AGENTS RULED OUT: ICD-10-CM

## 2020-12-22 PROCEDURE — U0003 INFECTIOUS AGENT DETECTION BY NUCLEIC ACID (DNA OR RNA); SEVERE ACUTE RESPIRATORY SYNDROME CORONAVIRUS 2 (SARS-COV-2) (CORONAVIRUS DISEASE [COVID-19]), AMPLIFIED PROBE TECHNIQUE, MAKING USE OF HIGH THROUGHPUT TECHNOLOGIES AS DESCRIBED BY CMS-2020-01-R: HCPCS

## 2020-12-23 LAB — SARS-COV-2 RNA RESP QL NAA+PROBE: NOT DETECTED

## 2020-12-29 ENCOUNTER — LAB VISIT (OUTPATIENT)
Dept: LAB | Facility: OTHER | Age: 85
End: 2020-12-29
Payer: MEDICARE

## 2020-12-29 DIAGNOSIS — Z03.818 ENCOUNTER FOR OBSERVATION FOR SUSPECTED EXPOSURE TO OTHER BIOLOGICAL AGENTS RULED OUT: ICD-10-CM

## 2020-12-29 PROCEDURE — U0003 INFECTIOUS AGENT DETECTION BY NUCLEIC ACID (DNA OR RNA); SEVERE ACUTE RESPIRATORY SYNDROME CORONAVIRUS 2 (SARS-COV-2) (CORONAVIRUS DISEASE [COVID-19]), AMPLIFIED PROBE TECHNIQUE, MAKING USE OF HIGH THROUGHPUT TECHNOLOGIES AS DESCRIBED BY CMS-2020-01-R: HCPCS

## 2020-12-30 LAB — SARS-COV-2 RNA RESP QL NAA+PROBE: NOT DETECTED

## 2021-01-06 ENCOUNTER — LAB VISIT (OUTPATIENT)
Dept: LAB | Facility: OTHER | Age: 86
End: 2021-01-06
Payer: MEDICARE

## 2021-01-06 DIAGNOSIS — Z03.818 ENCOUNTER FOR OBSERVATION FOR SUSPECTED EXPOSURE TO OTHER BIOLOGICAL AGENTS RULED OUT: ICD-10-CM

## 2021-01-06 PROCEDURE — U0003 INFECTIOUS AGENT DETECTION BY NUCLEIC ACID (DNA OR RNA); SEVERE ACUTE RESPIRATORY SYNDROME CORONAVIRUS 2 (SARS-COV-2) (CORONAVIRUS DISEASE [COVID-19]), AMPLIFIED PROBE TECHNIQUE, MAKING USE OF HIGH THROUGHPUT TECHNOLOGIES AS DESCRIBED BY CMS-2020-01-R: HCPCS

## 2021-01-08 LAB — SARS-COV-2 RNA RESP QL NAA+PROBE: NOT DETECTED

## 2021-01-13 ENCOUNTER — LAB VISIT (OUTPATIENT)
Dept: LAB | Facility: OTHER | Age: 86
End: 2021-01-13
Payer: MEDICARE

## 2021-01-13 DIAGNOSIS — Z20.822 ENCOUNTER FOR LABORATORY TESTING FOR COVID-19 VIRUS: ICD-10-CM

## 2021-01-13 PROCEDURE — U0003 INFECTIOUS AGENT DETECTION BY NUCLEIC ACID (DNA OR RNA); SEVERE ACUTE RESPIRATORY SYNDROME CORONAVIRUS 2 (SARS-COV-2) (CORONAVIRUS DISEASE [COVID-19]), AMPLIFIED PROBE TECHNIQUE, MAKING USE OF HIGH THROUGHPUT TECHNOLOGIES AS DESCRIBED BY CMS-2020-01-R: HCPCS

## 2021-01-14 LAB — SARS-COV-2 RNA RESP QL NAA+PROBE: NOT DETECTED

## 2021-01-20 ENCOUNTER — LAB VISIT (OUTPATIENT)
Dept: LAB | Facility: OTHER | Age: 86
End: 2021-01-20
Payer: MEDICARE

## 2021-01-20 DIAGNOSIS — Z20.822 ENCOUNTER FOR LABORATORY TESTING FOR COVID-19 VIRUS: ICD-10-CM

## 2021-01-20 PROCEDURE — U0003 INFECTIOUS AGENT DETECTION BY NUCLEIC ACID (DNA OR RNA); SEVERE ACUTE RESPIRATORY SYNDROME CORONAVIRUS 2 (SARS-COV-2) (CORONAVIRUS DISEASE [COVID-19]), AMPLIFIED PROBE TECHNIQUE, MAKING USE OF HIGH THROUGHPUT TECHNOLOGIES AS DESCRIBED BY CMS-2020-01-R: HCPCS

## 2021-01-22 LAB — SARS-COV-2 RNA RESP QL NAA+PROBE: NOT DETECTED

## 2021-01-27 ENCOUNTER — LAB VISIT (OUTPATIENT)
Dept: LAB | Facility: OTHER | Age: 86
End: 2021-01-27
Payer: MEDICARE

## 2021-01-27 DIAGNOSIS — Z20.822 ENCOUNTER FOR LABORATORY TESTING FOR COVID-19 VIRUS: ICD-10-CM

## 2021-01-27 PROCEDURE — U0003 INFECTIOUS AGENT DETECTION BY NUCLEIC ACID (DNA OR RNA); SEVERE ACUTE RESPIRATORY SYNDROME CORONAVIRUS 2 (SARS-COV-2) (CORONAVIRUS DISEASE [COVID-19]), AMPLIFIED PROBE TECHNIQUE, MAKING USE OF HIGH THROUGHPUT TECHNOLOGIES AS DESCRIBED BY CMS-2020-01-R: HCPCS

## 2021-01-28 LAB — SARS-COV-2 RNA RESP QL NAA+PROBE: NOT DETECTED

## 2021-02-03 ENCOUNTER — LAB VISIT (OUTPATIENT)
Dept: LAB | Facility: OTHER | Age: 86
End: 2021-02-03
Payer: MEDICARE

## 2021-02-03 DIAGNOSIS — Z20.822 ENCOUNTER FOR LABORATORY TESTING FOR COVID-19 VIRUS: ICD-10-CM

## 2021-02-03 PROCEDURE — U0003 INFECTIOUS AGENT DETECTION BY NUCLEIC ACID (DNA OR RNA); SEVERE ACUTE RESPIRATORY SYNDROME CORONAVIRUS 2 (SARS-COV-2) (CORONAVIRUS DISEASE [COVID-19]), AMPLIFIED PROBE TECHNIQUE, MAKING USE OF HIGH THROUGHPUT TECHNOLOGIES AS DESCRIBED BY CMS-2020-01-R: HCPCS

## 2021-02-04 LAB — SARS-COV-2 RNA RESP QL NAA+PROBE: NOT DETECTED

## 2021-02-10 ENCOUNTER — LAB VISIT (OUTPATIENT)
Dept: LAB | Facility: OTHER | Age: 86
End: 2021-02-10
Payer: MEDICARE

## 2021-02-10 DIAGNOSIS — Z20.822 ENCOUNTER FOR LABORATORY TESTING FOR COVID-19 VIRUS: ICD-10-CM

## 2021-02-10 PROCEDURE — U0003 INFECTIOUS AGENT DETECTION BY NUCLEIC ACID (DNA OR RNA); SEVERE ACUTE RESPIRATORY SYNDROME CORONAVIRUS 2 (SARS-COV-2) (CORONAVIRUS DISEASE [COVID-19]), AMPLIFIED PROBE TECHNIQUE, MAKING USE OF HIGH THROUGHPUT TECHNOLOGIES AS DESCRIBED BY CMS-2020-01-R: HCPCS

## 2021-02-11 LAB — SARS-COV-2 RNA RESP QL NAA+PROBE: NOT DETECTED

## 2021-02-17 ENCOUNTER — LAB VISIT (OUTPATIENT)
Dept: LAB | Facility: OTHER | Age: 86
End: 2021-02-17
Payer: MEDICARE

## 2021-02-17 DIAGNOSIS — Z20.822 ENCOUNTER FOR LABORATORY TESTING FOR COVID-19 VIRUS: ICD-10-CM

## 2021-02-17 PROCEDURE — U0003 INFECTIOUS AGENT DETECTION BY NUCLEIC ACID (DNA OR RNA); SEVERE ACUTE RESPIRATORY SYNDROME CORONAVIRUS 2 (SARS-COV-2) (CORONAVIRUS DISEASE [COVID-19]), AMPLIFIED PROBE TECHNIQUE, MAKING USE OF HIGH THROUGHPUT TECHNOLOGIES AS DESCRIBED BY CMS-2020-01-R: HCPCS

## 2021-02-19 LAB — SARS-COV-2 RNA RESP QL NAA+PROBE: NOT DETECTED

## 2021-02-24 ENCOUNTER — LAB VISIT (OUTPATIENT)
Dept: LAB | Facility: OTHER | Age: 86
End: 2021-02-24
Payer: MEDICARE

## 2021-02-24 DIAGNOSIS — Z20.822 ENCOUNTER FOR LABORATORY TESTING FOR COVID-19 VIRUS: ICD-10-CM

## 2021-02-24 PROCEDURE — U0003 INFECTIOUS AGENT DETECTION BY NUCLEIC ACID (DNA OR RNA); SEVERE ACUTE RESPIRATORY SYNDROME CORONAVIRUS 2 (SARS-COV-2) (CORONAVIRUS DISEASE [COVID-19]), AMPLIFIED PROBE TECHNIQUE, MAKING USE OF HIGH THROUGHPUT TECHNOLOGIES AS DESCRIBED BY CMS-2020-01-R: HCPCS

## 2021-02-25 LAB — SARS-COV-2 RNA RESP QL NAA+PROBE: NOT DETECTED

## 2021-03-03 ENCOUNTER — LAB VISIT (OUTPATIENT)
Dept: LAB | Facility: OTHER | Age: 86
End: 2021-03-03
Payer: MEDICARE

## 2021-03-03 DIAGNOSIS — Z20.822 ENCOUNTER FOR LABORATORY TESTING FOR COVID-19 VIRUS: ICD-10-CM

## 2021-03-03 PROCEDURE — U0003 INFECTIOUS AGENT DETECTION BY NUCLEIC ACID (DNA OR RNA); SEVERE ACUTE RESPIRATORY SYNDROME CORONAVIRUS 2 (SARS-COV-2) (CORONAVIRUS DISEASE [COVID-19]), AMPLIFIED PROBE TECHNIQUE, MAKING USE OF HIGH THROUGHPUT TECHNOLOGIES AS DESCRIBED BY CMS-2020-01-R: HCPCS | Performed by: NURSE PRACTITIONER

## 2021-03-04 LAB — SARS-COV-2 RNA RESP QL NAA+PROBE: NOT DETECTED

## 2021-03-10 ENCOUNTER — LAB VISIT (OUTPATIENT)
Dept: LAB | Facility: OTHER | Age: 86
End: 2021-03-10
Payer: MEDICARE

## 2021-03-10 DIAGNOSIS — Z20.822 ENCOUNTER FOR LABORATORY TESTING FOR COVID-19 VIRUS: ICD-10-CM

## 2021-03-10 PROCEDURE — U0003 INFECTIOUS AGENT DETECTION BY NUCLEIC ACID (DNA OR RNA); SEVERE ACUTE RESPIRATORY SYNDROME CORONAVIRUS 2 (SARS-COV-2) (CORONAVIRUS DISEASE [COVID-19]), AMPLIFIED PROBE TECHNIQUE, MAKING USE OF HIGH THROUGHPUT TECHNOLOGIES AS DESCRIBED BY CMS-2020-01-R: HCPCS | Performed by: NURSE PRACTITIONER

## 2021-03-11 LAB — SARS-COV-2 RNA RESP QL NAA+PROBE: NOT DETECTED

## 2021-03-17 ENCOUNTER — LAB VISIT (OUTPATIENT)
Dept: LAB | Facility: OTHER | Age: 86
End: 2021-03-17
Payer: MEDICARE

## 2021-03-17 DIAGNOSIS — Z20.822 ENCOUNTER FOR LABORATORY TESTING FOR COVID-19 VIRUS: ICD-10-CM

## 2021-03-17 PROCEDURE — U0003 INFECTIOUS AGENT DETECTION BY NUCLEIC ACID (DNA OR RNA); SEVERE ACUTE RESPIRATORY SYNDROME CORONAVIRUS 2 (SARS-COV-2) (CORONAVIRUS DISEASE [COVID-19]), AMPLIFIED PROBE TECHNIQUE, MAKING USE OF HIGH THROUGHPUT TECHNOLOGIES AS DESCRIBED BY CMS-2020-01-R: HCPCS | Performed by: NURSE PRACTITIONER

## 2021-03-18 LAB — SARS-COV-2 RNA RESP QL NAA+PROBE: NOT DETECTED

## 2021-03-24 ENCOUNTER — LAB VISIT (OUTPATIENT)
Dept: LAB | Facility: OTHER | Age: 86
End: 2021-03-24
Payer: MEDICARE

## 2021-03-24 DIAGNOSIS — Z20.822 ENCOUNTER FOR LABORATORY TESTING FOR COVID-19 VIRUS: ICD-10-CM

## 2021-03-24 PROCEDURE — U0003 INFECTIOUS AGENT DETECTION BY NUCLEIC ACID (DNA OR RNA); SEVERE ACUTE RESPIRATORY SYNDROME CORONAVIRUS 2 (SARS-COV-2) (CORONAVIRUS DISEASE [COVID-19]), AMPLIFIED PROBE TECHNIQUE, MAKING USE OF HIGH THROUGHPUT TECHNOLOGIES AS DESCRIBED BY CMS-2020-01-R: HCPCS | Performed by: NURSE PRACTITIONER

## 2021-03-25 LAB — SARS-COV-2 RNA RESP QL NAA+PROBE: NOT DETECTED

## 2021-03-31 ENCOUNTER — LAB VISIT (OUTPATIENT)
Dept: LAB | Facility: OTHER | Age: 86
End: 2021-03-31
Payer: MEDICARE

## 2021-03-31 DIAGNOSIS — Z20.822 ENCOUNTER FOR LABORATORY TESTING FOR COVID-19 VIRUS: ICD-10-CM

## 2021-03-31 PROCEDURE — U0003 INFECTIOUS AGENT DETECTION BY NUCLEIC ACID (DNA OR RNA); SEVERE ACUTE RESPIRATORY SYNDROME CORONAVIRUS 2 (SARS-COV-2) (CORONAVIRUS DISEASE [COVID-19]), AMPLIFIED PROBE TECHNIQUE, MAKING USE OF HIGH THROUGHPUT TECHNOLOGIES AS DESCRIBED BY CMS-2020-01-R: HCPCS | Performed by: NURSE PRACTITIONER

## 2021-04-01 LAB — SARS-COV-2 RNA RESP QL NAA+PROBE: NOT DETECTED

## 2021-04-07 ENCOUNTER — LAB VISIT (OUTPATIENT)
Dept: LAB | Facility: OTHER | Age: 86
End: 2021-04-07
Payer: MEDICARE

## 2021-04-07 DIAGNOSIS — Z20.822 ENCOUNTER FOR LABORATORY TESTING FOR COVID-19 VIRUS: ICD-10-CM

## 2021-04-07 PROCEDURE — U0003 INFECTIOUS AGENT DETECTION BY NUCLEIC ACID (DNA OR RNA); SEVERE ACUTE RESPIRATORY SYNDROME CORONAVIRUS 2 (SARS-COV-2) (CORONAVIRUS DISEASE [COVID-19]), AMPLIFIED PROBE TECHNIQUE, MAKING USE OF HIGH THROUGHPUT TECHNOLOGIES AS DESCRIBED BY CMS-2020-01-R: HCPCS | Performed by: NURSE PRACTITIONER

## 2021-04-08 LAB — SARS-COV-2 RNA RESP QL NAA+PROBE: NOT DETECTED

## 2021-04-14 ENCOUNTER — LAB VISIT (OUTPATIENT)
Dept: LAB | Facility: OTHER | Age: 86
End: 2021-04-14
Payer: MEDICARE

## 2021-04-14 DIAGNOSIS — Z20.822 ENCOUNTER FOR LABORATORY TESTING FOR COVID-19 VIRUS: ICD-10-CM

## 2021-04-14 PROCEDURE — U0003 INFECTIOUS AGENT DETECTION BY NUCLEIC ACID (DNA OR RNA); SEVERE ACUTE RESPIRATORY SYNDROME CORONAVIRUS 2 (SARS-COV-2) (CORONAVIRUS DISEASE [COVID-19]), AMPLIFIED PROBE TECHNIQUE, MAKING USE OF HIGH THROUGHPUT TECHNOLOGIES AS DESCRIBED BY CMS-2020-01-R: HCPCS | Performed by: NURSE PRACTITIONER

## 2021-04-15 LAB — SARS-COV-2 RNA RESP QL NAA+PROBE: NOT DETECTED

## 2021-04-21 ENCOUNTER — LAB VISIT (OUTPATIENT)
Dept: LAB | Facility: OTHER | Age: 86
End: 2021-04-21
Payer: MEDICARE

## 2021-04-21 DIAGNOSIS — Z20.822 ENCOUNTER FOR LABORATORY TESTING FOR COVID-19 VIRUS: ICD-10-CM

## 2021-04-21 PROCEDURE — U0003 INFECTIOUS AGENT DETECTION BY NUCLEIC ACID (DNA OR RNA); SEVERE ACUTE RESPIRATORY SYNDROME CORONAVIRUS 2 (SARS-COV-2) (CORONAVIRUS DISEASE [COVID-19]), AMPLIFIED PROBE TECHNIQUE, MAKING USE OF HIGH THROUGHPUT TECHNOLOGIES AS DESCRIBED BY CMS-2020-01-R: HCPCS | Performed by: NURSE PRACTITIONER

## 2021-04-22 LAB — SARS-COV-2 RNA RESP QL NAA+PROBE: NOT DETECTED

## 2021-04-28 ENCOUNTER — LAB VISIT (OUTPATIENT)
Dept: LAB | Facility: OTHER | Age: 86
End: 2021-04-28
Payer: MEDICARE

## 2021-04-28 DIAGNOSIS — Z20.822 ENCOUNTER FOR LABORATORY TESTING FOR COVID-19 VIRUS: ICD-10-CM

## 2021-04-28 PROCEDURE — U0003 INFECTIOUS AGENT DETECTION BY NUCLEIC ACID (DNA OR RNA); SEVERE ACUTE RESPIRATORY SYNDROME CORONAVIRUS 2 (SARS-COV-2) (CORONAVIRUS DISEASE [COVID-19]), AMPLIFIED PROBE TECHNIQUE, MAKING USE OF HIGH THROUGHPUT TECHNOLOGIES AS DESCRIBED BY CMS-2020-01-R: HCPCS | Performed by: NURSE PRACTITIONER

## 2021-04-29 LAB — SARS-COV-2 RNA RESP QL NAA+PROBE: NOT DETECTED

## 2021-05-05 ENCOUNTER — LAB VISIT (OUTPATIENT)
Dept: LAB | Facility: OTHER | Age: 86
End: 2021-05-05
Payer: MEDICARE

## 2021-05-05 DIAGNOSIS — Z20.822 ENCOUNTER FOR LABORATORY TESTING FOR COVID-19 VIRUS: ICD-10-CM

## 2021-05-05 PROCEDURE — U0003 INFECTIOUS AGENT DETECTION BY NUCLEIC ACID (DNA OR RNA); SEVERE ACUTE RESPIRATORY SYNDROME CORONAVIRUS 2 (SARS-COV-2) (CORONAVIRUS DISEASE [COVID-19]), AMPLIFIED PROBE TECHNIQUE, MAKING USE OF HIGH THROUGHPUT TECHNOLOGIES AS DESCRIBED BY CMS-2020-01-R: HCPCS | Performed by: NURSE PRACTITIONER

## 2021-05-06 DIAGNOSIS — U07.1 COVID-19 VIRUS DETECTED: ICD-10-CM

## 2021-05-06 LAB — SARS-COV-2 RNA RESP QL NAA+PROBE: DETECTED

## 2021-05-12 ENCOUNTER — PATIENT OUTREACH (OUTPATIENT)
Dept: INFECTIOUS DISEASES | Facility: HOSPITAL | Age: 86
End: 2021-05-12

## 2021-06-23 ENCOUNTER — LAB VISIT (OUTPATIENT)
Dept: LAB | Facility: OTHER | Age: 86
End: 2021-06-23
Payer: MEDICARE

## 2021-06-23 DIAGNOSIS — Z20.822 ENCOUNTER FOR LABORATORY TESTING FOR COVID-19 VIRUS: ICD-10-CM

## 2021-06-23 PROCEDURE — U0003 INFECTIOUS AGENT DETECTION BY NUCLEIC ACID (DNA OR RNA); SEVERE ACUTE RESPIRATORY SYNDROME CORONAVIRUS 2 (SARS-COV-2) (CORONAVIRUS DISEASE [COVID-19]), AMPLIFIED PROBE TECHNIQUE, MAKING USE OF HIGH THROUGHPUT TECHNOLOGIES AS DESCRIBED BY CMS-2020-01-R: HCPCS | Performed by: NURSE PRACTITIONER

## 2021-06-24 LAB — SARS-COV-2 RNA RESP QL NAA+PROBE: NOT DETECTED

## 2021-06-30 ENCOUNTER — LAB VISIT (OUTPATIENT)
Dept: LAB | Facility: OTHER | Age: 86
End: 2021-06-30
Payer: MEDICARE

## 2021-06-30 DIAGNOSIS — Z20.822 ENCOUNTER FOR LABORATORY TESTING FOR COVID-19 VIRUS: ICD-10-CM

## 2021-06-30 PROCEDURE — U0003 INFECTIOUS AGENT DETECTION BY NUCLEIC ACID (DNA OR RNA); SEVERE ACUTE RESPIRATORY SYNDROME CORONAVIRUS 2 (SARS-COV-2) (CORONAVIRUS DISEASE [COVID-19]), AMPLIFIED PROBE TECHNIQUE, MAKING USE OF HIGH THROUGHPUT TECHNOLOGIES AS DESCRIBED BY CMS-2020-01-R: HCPCS | Performed by: NURSE PRACTITIONER

## 2021-07-01 LAB — SARS-COV-2 RNA RESP QL NAA+PROBE: NOT DETECTED

## 2021-08-04 ENCOUNTER — LAB VISIT (OUTPATIENT)
Dept: LAB | Facility: OTHER | Age: 86
End: 2021-08-04
Payer: MEDICARE

## 2021-08-04 DIAGNOSIS — Z20.822 ENCOUNTER FOR LABORATORY TESTING FOR COVID-19 VIRUS: ICD-10-CM

## 2021-08-04 PROCEDURE — U0003 INFECTIOUS AGENT DETECTION BY NUCLEIC ACID (DNA OR RNA); SEVERE ACUTE RESPIRATORY SYNDROME CORONAVIRUS 2 (SARS-COV-2) (CORONAVIRUS DISEASE [COVID-19]), AMPLIFIED PROBE TECHNIQUE, MAKING USE OF HIGH THROUGHPUT TECHNOLOGIES AS DESCRIBED BY CMS-2020-01-R: HCPCS | Performed by: NURSE PRACTITIONER

## 2021-08-06 LAB
SARS-COV-2 RNA RESP QL NAA+PROBE: NORMAL
TEST PERFORMANCE INFO SPEC: NORMAL

## 2021-08-11 ENCOUNTER — LAB VISIT (OUTPATIENT)
Dept: LAB | Facility: OTHER | Age: 86
End: 2021-08-11
Payer: MEDICARE

## 2021-08-11 DIAGNOSIS — Z20.822 ENCOUNTER FOR LABORATORY TESTING FOR COVID-19 VIRUS: ICD-10-CM

## 2021-08-11 PROCEDURE — U0003 INFECTIOUS AGENT DETECTION BY NUCLEIC ACID (DNA OR RNA); SEVERE ACUTE RESPIRATORY SYNDROME CORONAVIRUS 2 (SARS-COV-2) (CORONAVIRUS DISEASE [COVID-19]), AMPLIFIED PROBE TECHNIQUE, MAKING USE OF HIGH THROUGHPUT TECHNOLOGIES AS DESCRIBED BY CMS-2020-01-R: HCPCS | Performed by: NURSE PRACTITIONER

## 2021-08-12 LAB
SARS-COV-2 RNA RESP QL NAA+PROBE: NOT DETECTED
SARS-COV-2- CYCLE NUMBER: -1

## 2021-08-18 ENCOUNTER — LAB VISIT (OUTPATIENT)
Dept: LAB | Facility: OTHER | Age: 86
End: 2021-08-18
Payer: MEDICARE

## 2021-08-18 DIAGNOSIS — Z20.822 ENCOUNTER FOR LABORATORY TESTING FOR COVID-19 VIRUS: ICD-10-CM

## 2021-08-18 PROCEDURE — U0003 INFECTIOUS AGENT DETECTION BY NUCLEIC ACID (DNA OR RNA); SEVERE ACUTE RESPIRATORY SYNDROME CORONAVIRUS 2 (SARS-COV-2) (CORONAVIRUS DISEASE [COVID-19]), AMPLIFIED PROBE TECHNIQUE, MAKING USE OF HIGH THROUGHPUT TECHNOLOGIES AS DESCRIBED BY CMS-2020-01-R: HCPCS | Performed by: NURSE PRACTITIONER

## 2021-08-20 LAB
SARS-COV-2 RNA RESP QL NAA+PROBE: NORMAL
TEST PERFORMANCE INFO SPEC: NORMAL

## 2021-08-25 ENCOUNTER — LAB VISIT (OUTPATIENT)
Dept: LAB | Facility: OTHER | Age: 86
End: 2021-08-25
Payer: MEDICARE

## 2021-08-25 DIAGNOSIS — Z20.822 ENCOUNTER FOR LABORATORY TESTING FOR COVID-19 VIRUS: ICD-10-CM

## 2021-08-25 PROCEDURE — U0003 INFECTIOUS AGENT DETECTION BY NUCLEIC ACID (DNA OR RNA); SEVERE ACUTE RESPIRATORY SYNDROME CORONAVIRUS 2 (SARS-COV-2) (CORONAVIRUS DISEASE [COVID-19]), AMPLIFIED PROBE TECHNIQUE, MAKING USE OF HIGH THROUGHPUT TECHNOLOGIES AS DESCRIBED BY CMS-2020-01-R: HCPCS | Performed by: NURSE PRACTITIONER

## 2021-08-27 LAB
SARS-COV-2 RNA RESP QL NAA+PROBE: NORMAL
TEST PERFORMANCE INFO SPEC: NORMAL

## 2021-09-27 ENCOUNTER — HISTORICAL (OUTPATIENT)
Dept: ADMINISTRATIVE | Facility: HOSPITAL | Age: 86
End: 2021-09-27

## 2021-09-27 LAB
ABS NEUT (OLG): 9.7 X10(3)/MCL (ref 2.1–9.2)
ALBUMIN SERPL-MCNC: 3.3 GM/DL (ref 3.4–4.8)
ALBUMIN/GLOB SERPL: 0.8 RATIO (ref 1.1–2)
ALP SERPL-CCNC: 72 UNIT/L (ref 40–150)
ALT SERPL-CCNC: 8 UNIT/L (ref 0–55)
AST SERPL-CCNC: 23 UNIT/L (ref 5–34)
BASOPHILS # BLD AUTO: 0 X10(3)/MCL (ref 0–0.2)
BASOPHILS NFR BLD AUTO: 0 %
BILIRUB SERPL-MCNC: 0.3 MG/DL
BILIRUBIN DIRECT+TOT PNL SERPL-MCNC: 0.1 MG/DL (ref 0–0.8)
BILIRUBIN DIRECT+TOT PNL SERPL-MCNC: 0.2 MG/DL (ref 0–0.5)
BUN SERPL-MCNC: 18.4 MG/DL (ref 9.8–20.1)
CALCIUM SERPL-MCNC: 10 MG/DL (ref 8.4–10.2)
CHLORIDE SERPL-SCNC: 102 MMOL/L (ref 98–111)
CO2 SERPL-SCNC: 27 MMOL/L (ref 23–31)
CREAT SERPL-MCNC: 0.72 MG/DL (ref 0.55–1.02)
EOSINOPHIL # BLD AUTO: 0 X10(3)/MCL (ref 0–0.9)
EOSINOPHIL NFR BLD AUTO: 0 %
ERYTHROCYTE [DISTWIDTH] IN BLOOD BY AUTOMATED COUNT: 12.4 % (ref 11.5–17)
GLOBULIN SER-MCNC: 4.4 GM/DL (ref 2.4–3.5)
GLUCOSE SERPL-MCNC: 144 MG/DL (ref 75–121)
HCT VFR BLD AUTO: 33.9 % (ref 37–47)
HGB BLD-MCNC: 10.9 GM/DL (ref 12–16)
LYMPHOCYTES # BLD AUTO: 0.9 X10(3)/MCL (ref 0.6–4.6)
LYMPHOCYTES NFR BLD AUTO: 8 %
MCH RBC QN AUTO: 30.4 PG (ref 27–31)
MCHC RBC AUTO-ENTMCNC: 32.2 GM/DL (ref 33–36)
MCV RBC AUTO: 94.7 FL (ref 80–94)
MONOCYTES # BLD AUTO: 0.5 X10(3)/MCL (ref 0.1–1.3)
MONOCYTES NFR BLD AUTO: 5 %
NEUTROPHILS # BLD AUTO: 9.7 X10(3)/MCL (ref 2.1–9.2)
NEUTROPHILS NFR BLD AUTO: 86 %
PLATELET # BLD AUTO: 276 X10(3)/MCL (ref 130–400)
PMV BLD AUTO: 10.4 FL (ref 9.4–12.4)
POTASSIUM SERPL-SCNC: 5 MMOL/L (ref 3.5–5.1)
PROT SERPL-MCNC: 7.7 GM/DL (ref 5.8–7.6)
RBC # BLD AUTO: 3.58 X10(6)/MCL (ref 4.2–5.4)
SODIUM SERPL-SCNC: 140 MMOL/L (ref 132–146)
WBC # SPEC AUTO: 11.2 X10(3)/MCL (ref 4.5–11.5)

## 2021-09-29 ENCOUNTER — HISTORICAL (OUTPATIENT)
Dept: ADMINISTRATIVE | Facility: HOSPITAL | Age: 86
End: 2021-09-29

## 2021-09-29 LAB
APPEARANCE, UA: ABNORMAL
BACTERIA SPEC CULT: ABNORMAL /HPF
BILIRUB UR QL STRIP: NEGATIVE
COLOR UR: YELLOW
GLUCOSE (UA): NEGATIVE
HGB UR QL STRIP: ABNORMAL
KETONES UR QL STRIP: ABNORMAL
LEUKOCYTE ESTERASE UR QL STRIP: ABNORMAL
MUCOUS THREADS URNS QL MICRO: ABNORMAL
NITRITE UR QL STRIP: NEGATIVE
PH UR STRIP: 5.5 [PH] (ref 5–9)
PROT UR QL STRIP: ABNORMAL
RBC #/AREA URNS HPF: ABNORMAL /HPF
SP GR UR STRIP: 1.02 (ref 1–1.03)
SQUAMOUS EPITHELIAL, UA: 14 /HPF (ref 0–4)
UROBILINOGEN UR STRIP-ACNC: 1
WBC #/AREA URNS HPF: 141 /HPF (ref 0–3)

## 2021-10-15 ENCOUNTER — LAB VISIT (OUTPATIENT)
Dept: LAB | Facility: OTHER | Age: 86
End: 2021-10-15
Payer: MEDICARE

## 2021-10-15 DIAGNOSIS — Z20.822 ENCOUNTER FOR LABORATORY TESTING FOR COVID-19 VIRUS: ICD-10-CM

## 2021-10-15 PROCEDURE — U0003 INFECTIOUS AGENT DETECTION BY NUCLEIC ACID (DNA OR RNA); SEVERE ACUTE RESPIRATORY SYNDROME CORONAVIRUS 2 (SARS-COV-2) (CORONAVIRUS DISEASE [COVID-19]), AMPLIFIED PROBE TECHNIQUE, MAKING USE OF HIGH THROUGHPUT TECHNOLOGIES AS DESCRIBED BY CMS-2020-01-R: HCPCS | Performed by: NURSE PRACTITIONER

## 2021-10-16 LAB
SARS-COV-2 RNA RESP QL NAA+PROBE: NOT DETECTED
SARS-COV-2- CYCLE NUMBER: NORMAL

## 2021-10-22 ENCOUNTER — LAB VISIT (OUTPATIENT)
Dept: LAB | Facility: OTHER | Age: 86
End: 2021-10-22
Payer: MEDICARE

## 2021-10-22 DIAGNOSIS — Z20.822 ENCOUNTER FOR LABORATORY TESTING FOR COVID-19 VIRUS: ICD-10-CM

## 2021-10-22 PROCEDURE — U0003 INFECTIOUS AGENT DETECTION BY NUCLEIC ACID (DNA OR RNA); SEVERE ACUTE RESPIRATORY SYNDROME CORONAVIRUS 2 (SARS-COV-2) (CORONAVIRUS DISEASE [COVID-19]), AMPLIFIED PROBE TECHNIQUE, MAKING USE OF HIGH THROUGHPUT TECHNOLOGIES AS DESCRIBED BY CMS-2020-01-R: HCPCS | Performed by: NURSE PRACTITIONER

## 2021-10-23 LAB
SARS-COV-2 RNA RESP QL NAA+PROBE: NOT DETECTED
SARS-COV-2- CYCLE NUMBER: NORMAL

## 2021-10-29 ENCOUNTER — LAB VISIT (OUTPATIENT)
Dept: LAB | Facility: OTHER | Age: 86
End: 2021-10-29
Payer: MEDICARE

## 2021-10-29 DIAGNOSIS — Z20.822 ENCOUNTER FOR LABORATORY TESTING FOR COVID-19 VIRUS: ICD-10-CM

## 2021-10-29 LAB
SARS-COV-2 RNA RESP QL NAA+PROBE: NOT DETECTED
SARS-COV-2- CYCLE NUMBER: NORMAL

## 2021-10-29 PROCEDURE — U0003 INFECTIOUS AGENT DETECTION BY NUCLEIC ACID (DNA OR RNA); SEVERE ACUTE RESPIRATORY SYNDROME CORONAVIRUS 2 (SARS-COV-2) (CORONAVIRUS DISEASE [COVID-19]), AMPLIFIED PROBE TECHNIQUE, MAKING USE OF HIGH THROUGHPUT TECHNOLOGIES AS DESCRIBED BY CMS-2020-01-R: HCPCS | Performed by: NURSE PRACTITIONER

## 2021-11-05 ENCOUNTER — LAB VISIT (OUTPATIENT)
Dept: LAB | Facility: OTHER | Age: 86
End: 2021-11-05
Payer: MEDICARE

## 2021-11-05 DIAGNOSIS — Z20.822 ENCOUNTER FOR LABORATORY TESTING FOR COVID-19 VIRUS: ICD-10-CM

## 2021-11-05 PROCEDURE — U0003 INFECTIOUS AGENT DETECTION BY NUCLEIC ACID (DNA OR RNA); SEVERE ACUTE RESPIRATORY SYNDROME CORONAVIRUS 2 (SARS-COV-2) (CORONAVIRUS DISEASE [COVID-19]), AMPLIFIED PROBE TECHNIQUE, MAKING USE OF HIGH THROUGHPUT TECHNOLOGIES AS DESCRIBED BY CMS-2020-01-R: HCPCS | Performed by: NURSE PRACTITIONER

## 2021-11-06 LAB
SARS-COV-2 RNA RESP QL NAA+PROBE: NOT DETECTED
SARS-COV-2- CYCLE NUMBER: NORMAL

## 2021-11-07 ENCOUNTER — HOSPITAL ENCOUNTER (EMERGENCY)
Facility: HOSPITAL | Age: 86
Discharge: HOME | End: 2021-11-09
Attending: EMERGENCY MEDICINE
Payer: MEDICARE

## 2021-11-07 DIAGNOSIS — S32.10XA CLOSED FRACTURE OF SACRUM, UNSPECIFIED PORTION OF SACRUM, INITIAL ENCOUNTER: Primary | ICD-10-CM

## 2021-11-07 DIAGNOSIS — M79.673 FOOT PAIN: ICD-10-CM
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
## 2021-11-07 LAB
BASOPHILS # BLD AUTO: 0.03 K/UL (ref 0–0.2)
BASOPHILS NFR BLD: 0.3 % (ref 0–1.9)
BUN SERPL-MCNC: 55 MG/DL (ref 6–30)
CHLORIDE SERPL-SCNC: 110 MMOL/L (ref 95–110)
CREAT SERPL-MCNC: 1 MG/DL (ref 0.5–1.4)
DIFFERENTIAL METHOD: ABNORMAL
EOSINOPHIL # BLD AUTO: 0 K/UL (ref 0–0.5)
EOSINOPHIL NFR BLD: 0.3 % (ref 0–8)
ERYTHROCYTE [DISTWIDTH] IN BLOOD BY AUTOMATED COUNT: 13.5 % (ref 11.5–14.5)
GLUCOSE SERPL-MCNC: 178 MG/DL (ref 70–110)
HCT VFR BLD AUTO: 34.4 % (ref 37–48.5)
HCT VFR BLD CALC: 32 %PCV (ref 36–54)
HGB BLD-MCNC: 10.4 G/DL (ref 12–16)
IMM GRANULOCYTES # BLD AUTO: 0.05 K/UL (ref 0–0.04)
IMM GRANULOCYTES NFR BLD AUTO: 0.6 % (ref 0–0.5)
LYMPHOCYTES # BLD AUTO: 1.4 K/UL (ref 1–4.8)
LYMPHOCYTES NFR BLD: 15.9 % (ref 18–48)
MCH RBC QN AUTO: 30.1 PG (ref 27–31)
MCHC RBC AUTO-ENTMCNC: 30.2 G/DL (ref 32–36)
MCV RBC AUTO: 99 FL (ref 82–98)
MONOCYTES # BLD AUTO: 0.7 K/UL (ref 0.3–1)
MONOCYTES NFR BLD: 7.8 % (ref 4–15)
NEUTROPHILS # BLD AUTO: 6.5 K/UL (ref 1.8–7.7)
NEUTROPHILS NFR BLD: 75.1 % (ref 38–73)
NRBC BLD-RTO: 0 /100 WBC
PLATELET # BLD AUTO: 287 K/UL (ref 150–450)
PMV BLD AUTO: 10.8 FL (ref 9.2–12.9)
POC IONIZED CALCIUM: 1.23 MMOL/L (ref 1.06–1.42)
POC TCO2 (MEASURED): 29 MMOL/L (ref 23–29)
POTASSIUM BLD-SCNC: 4.7 MMOL/L (ref 3.5–5.1)
RBC # BLD AUTO: 3.46 M/UL (ref 4–5.4)
SAMPLE: ABNORMAL
SODIUM BLD-SCNC: 148 MMOL/L (ref 136–145)
WBC # BLD AUTO: 8.64 K/UL (ref 3.9–12.7)

## 2021-11-07 PROCEDURE — 99285 EMERGENCY DEPT VISIT HI MDM: CPT | Mod: 25

## 2021-11-07 PROCEDURE — 96361 HYDRATE IV INFUSION ADD-ON: CPT

## 2021-11-07 PROCEDURE — 25000003 PHARM REV CODE 250: Performed by: EMERGENCY MEDICINE

## 2021-11-07 PROCEDURE — 80047 BASIC METABLC PNL IONIZED CA: CPT

## 2021-11-07 PROCEDURE — 85025 COMPLETE CBC W/AUTO DIFF WBC: CPT | Performed by: EMERGENCY MEDICINE

## 2021-11-07 PROCEDURE — 99284 PR EMERGENCY DEPT VISIT,LEVEL IV: ICD-10-PCS | Mod: GC,,, | Performed by: EMERGENCY MEDICINE

## 2021-11-07 PROCEDURE — 99284 EMERGENCY DEPT VISIT MOD MDM: CPT | Mod: GC,,, | Performed by: EMERGENCY MEDICINE

## 2021-11-07 PROCEDURE — 96360 HYDRATION IV INFUSION INIT: CPT

## 2021-11-07 RX ADMIN — SODIUM CHLORIDE 1000 ML: 0.9 INJECTION, SOLUTION INTRAVENOUS at 07:11

## 2021-11-08 VITALS
SYSTOLIC BLOOD PRESSURE: 131 MMHG | OXYGEN SATURATION: 95 % | DIASTOLIC BLOOD PRESSURE: 73 MMHG | RESPIRATION RATE: 16 BRPM | WEIGHT: 164 LBS | TEMPERATURE: 98 F | BODY MASS INDEX: 28.15 KG/M2 | HEART RATE: 88 BPM

## 2021-12-03 DIAGNOSIS — Z20.822 ENCOUNTER FOR LABORATORY TESTING FOR COVID-19 VIRUS: ICD-10-CM
